# Patient Record
Sex: FEMALE | Race: WHITE | Employment: PART TIME | URBAN - NONMETROPOLITAN AREA
[De-identification: names, ages, dates, MRNs, and addresses within clinical notes are randomized per-mention and may not be internally consistent; named-entity substitution may affect disease eponyms.]

---

## 2018-09-26 ENCOUNTER — HOSPITAL ENCOUNTER (OUTPATIENT)
Age: 58
Discharge: HOME | End: 2018-09-26
Admitting: PSYCHIATRY & NEUROLOGY

## 2018-09-26 LAB
ANION GAP SERPL CALCULATED.3IONS-SCNC: 13 MEQ/L (ref 8–16)
BUN BLDV-MCNC: 20 MG/DL (ref 7–22)
CALCIUM SERPL-MCNC: 9.8 MG/DL (ref 8.5–10.5)
CHLORIDE BLD-SCNC: 100 MEQ/L (ref 98–111)
CO2: 27 MEQ/L (ref 23–33)
CREAT SERPL-MCNC: 0.8 MG/DL (ref 0.4–1.2)
ERYTHROCYTE [DISTWIDTH] IN BLOOD BY AUTOMATED COUNT: 13.3 % (ref 11.5–14.5)
ERYTHROCYTE [DISTWIDTH] IN BLOOD BY AUTOMATED COUNT: 43.8 FL (ref 35–45)
GFR SERPL CREATININE-BSD FRML MDRD: 74 ML/MIN/1.73M2
GLUCOSE BLD-MCNC: 134 MG/DL (ref 70–108)
HCT VFR BLD CALC: 47.7 % (ref 37–47)
HEMOGLOBIN: 16 GM/DL (ref 12–16)
INR BLD: 1.01 (ref 0.85–1.13)
MCH RBC QN AUTO: 30.4 PG (ref 26–33)
MCHC RBC AUTO-ENTMCNC: 33.5 GM/DL (ref 32.2–35.5)
MCV RBC AUTO: 90.5 FL (ref 81–99)
PLATELET # BLD: 183 THOU/MM3 (ref 130–400)
PMV BLD AUTO: 11 FL (ref 9.4–12.4)
POTASSIUM SERPL-SCNC: 4.4 MEQ/L (ref 3.5–5.2)
RBC # BLD: 5.27 MILL/MM3 (ref 4.2–5.4)
SODIUM BLD-SCNC: 140 MEQ/L (ref 135–145)
WBC # BLD: 7.5 THOU/MM3 (ref 4.8–10.8)

## 2018-09-26 PROCEDURE — 36415 COLL VENOUS BLD VENIPUNCTURE: CPT

## 2018-09-26 PROCEDURE — 85027 COMPLETE CBC AUTOMATED: CPT

## 2018-09-26 PROCEDURE — 85610 PROTHROMBIN TIME: CPT

## 2018-09-26 PROCEDURE — 80048 BASIC METABOLIC PNL TOTAL CA: CPT

## 2020-03-04 ENCOUNTER — HOSPITAL ENCOUNTER (OUTPATIENT)
Age: 60
Setting detail: SPECIMEN
Discharge: HOME | End: 2020-03-04
Payer: COMMERCIAL

## 2020-03-04 LAB
ABSOLUTE EOS #: 0.09 K/UL (ref 0–0.44)
ABSOLUTE IMMATURE GRANULOCYTE: <0.03 K/UL (ref 0–0.3)
ABSOLUTE LYMPH #: 1.49 K/UL (ref 1.1–3.7)
ABSOLUTE MONO #: 0.47 K/UL (ref 0.1–1.2)
ALBUMIN SERPL-MCNC: 3.9 G/DL (ref 3.5–5.2)
ALBUMIN/GLOBULIN RATIO: 1.4 (ref 1–2.5)
ALP BLD-CCNC: 84 U/L (ref 35–104)
ALT SERPL-CCNC: 144 U/L (ref 5–33)
ANION GAP SERPL CALCULATED.3IONS-SCNC: 14 MMOL/L (ref 9–17)
AST SERPL-CCNC: 73 U/L
BASOPHILS # BLD: 1 % (ref 0–2)
BASOPHILS ABSOLUTE: 0.03 K/UL (ref 0–0.2)
BILIRUB SERPL-MCNC: 1.63 MG/DL (ref 0.3–1.2)
BUN BLDV-MCNC: 15 MG/DL (ref 6–20)
BUN/CREAT BLD: ABNORMAL (ref 9–20)
CALCIUM SERPL-MCNC: 9.7 MG/DL (ref 8.6–10.4)
CHLORIDE BLD-SCNC: 100 MMOL/L (ref 98–107)
CHOLESTEROL/HDL RATIO: 3.8
CHOLESTEROL: 157 MG/DL
CO2: 24 MMOL/L (ref 20–31)
CREAT SERPL-MCNC: 0.56 MG/DL (ref 0.5–0.9)
DIFFERENTIAL TYPE: ABNORMAL
EOSINOPHILS RELATIVE PERCENT: 2 % (ref 1–4)
GFR AFRICAN AMERICAN: >60 ML/MIN
GFR NON-AFRICAN AMERICAN: >60 ML/MIN
GFR SERPL CREATININE-BSD FRML MDRD: ABNORMAL ML/MIN/{1.73_M2}
GFR SERPL CREATININE-BSD FRML MDRD: ABNORMAL ML/MIN/{1.73_M2}
GLUCOSE BLD-MCNC: 296 MG/DL (ref 70–99)
HCT VFR BLD CALC: 48.3 % (ref 36.3–47.1)
HDLC SERPL-MCNC: 41 MG/DL
HEMOGLOBIN: 15.8 G/DL (ref 11.9–15.1)
IMMATURE GRANULOCYTES: 0 %
LDL CHOLESTEROL: 84 MG/DL (ref 0–130)
LYMPHOCYTES # BLD: 29 % (ref 24–43)
MCH RBC QN AUTO: 29.6 PG (ref 25.2–33.5)
MCHC RBC AUTO-ENTMCNC: 32.7 G/DL (ref 28.4–34.8)
MCV RBC AUTO: 90.4 FL (ref 82.6–102.9)
MONOCYTES # BLD: 9 % (ref 3–12)
NRBC AUTOMATED: 0 PER 100 WBC
PDW BLD-RTO: 12.6 % (ref 11.8–14.4)
PLATELET # BLD: 125 K/UL (ref 138–453)
PLATELET ESTIMATE: ABNORMAL
PMV BLD AUTO: 12.1 FL (ref 8.1–13.5)
POTASSIUM SERPL-SCNC: 3.7 MMOL/L (ref 3.7–5.3)
RBC # BLD: 5.34 M/UL (ref 3.95–5.11)
RBC # BLD: ABNORMAL 10*6/UL
SEG NEUTROPHILS: 59 % (ref 36–65)
SEGMENTED NEUTROPHILS ABSOLUTE COUNT: 3.07 K/UL (ref 1.5–8.1)
SODIUM BLD-SCNC: 138 MMOL/L (ref 135–144)
TOTAL PROTEIN: 6.7 G/DL (ref 6.4–8.3)
TRIGL SERPL-MCNC: 159 MG/DL
TSH SERPL DL<=0.05 MIU/L-ACNC: 1.53 MIU/L (ref 0.3–5)
VLDLC SERPL CALC-MCNC: ABNORMAL MG/DL (ref 1–30)
WBC # BLD: 5.2 K/UL (ref 3.5–11.3)
WBC # BLD: ABNORMAL 10*3/UL

## 2020-09-02 ENCOUNTER — HOSPITAL ENCOUNTER (OUTPATIENT)
Age: 60
Setting detail: SPECIMEN
Discharge: HOME | End: 2020-09-02
Payer: COMMERCIAL

## 2020-09-03 LAB
ALBUMIN SERPL-MCNC: 4.5 G/DL (ref 3.5–5.2)
ALBUMIN/GLOBULIN RATIO: 1.6 (ref 1–2.5)
ALP BLD-CCNC: 72 U/L (ref 35–104)
ALT SERPL-CCNC: 33 U/L (ref 5–33)
AMYLASE: 34 U/L (ref 28–100)
AST SERPL-CCNC: 26 U/L
BILIRUB SERPL-MCNC: 1.54 MG/DL (ref 0.3–1.2)
BILIRUBIN DIRECT: 0.33 MG/DL
BILIRUBIN, INDIRECT: 1.21 MG/DL (ref 0–1)
GLOBULIN: ABNORMAL G/DL (ref 1.5–3.8)
LIPASE: 38 U/L (ref 13–60)
TOTAL PROTEIN: 7.3 G/DL (ref 6.4–8.3)

## 2021-04-21 ENCOUNTER — APPOINTMENT (OUTPATIENT)
Dept: GENERAL RADIOLOGY | Age: 61
DRG: 287 | End: 2021-04-21
Payer: COMMERCIAL

## 2021-04-21 ENCOUNTER — HOSPITAL ENCOUNTER (INPATIENT)
Age: 61
LOS: 2 days | Discharge: HOME | DRG: 287 | End: 2021-04-23
Attending: EMERGENCY MEDICINE | Admitting: INTERNAL MEDICINE
Payer: COMMERCIAL

## 2021-04-21 DIAGNOSIS — I50.23 ACUTE ON CHRONIC SYSTOLIC HEART FAILURE (HCC): ICD-10-CM

## 2021-04-21 DIAGNOSIS — I42.2 HYPERTROPHIC CARDIOMYOPATHY (HCC): Primary | ICD-10-CM

## 2021-04-21 DIAGNOSIS — R77.8 ELEVATED TROPONIN: ICD-10-CM

## 2021-04-21 PROBLEM — I20.0 UNSTABLE ANGINA (HCC): Status: ACTIVE | Noted: 2021-04-21

## 2021-04-21 PROBLEM — R07.9 CHEST PAIN: Status: ACTIVE | Noted: 2021-04-21

## 2021-04-21 LAB
ANION GAP SERPL CALCULATED.3IONS-SCNC: 12 MEQ/L (ref 8–16)
AVERAGE GLUCOSE: 108 MG/DL (ref 70–126)
BASOPHILS # BLD: 0.5 %
BASOPHILS ABSOLUTE: 0 THOU/MM3 (ref 0–0.1)
BUN BLDV-MCNC: 20 MG/DL (ref 7–22)
CALCIUM SERPL-MCNC: 9.9 MG/DL (ref 8.5–10.5)
CHLORIDE BLD-SCNC: 105 MEQ/L (ref 98–111)
CHOLESTEROL, TOTAL: 122 MG/DL (ref 100–199)
CO2: 25 MEQ/L (ref 23–33)
CREAT SERPL-MCNC: 0.7 MG/DL (ref 0.4–1.2)
EKG ATRIAL RATE: 77 BPM
EKG P AXIS: 94 DEGREES
EKG P-R INTERVAL: 154 MS
EKG Q-T INTERVAL: 466 MS
EKG QRS DURATION: 172 MS
EKG QTC CALCULATION (BAZETT): 527 MS
EKG R AXIS: -77 DEGREES
EKG T AXIS: 83 DEGREES
EKG VENTRICULAR RATE: 77 BPM
EOSINOPHIL # BLD: 0 %
EOSINOPHILS ABSOLUTE: 0 THOU/MM3 (ref 0–0.4)
ERYTHROCYTE [DISTWIDTH] IN BLOOD BY AUTOMATED COUNT: 14.1 % (ref 11.5–14.5)
ERYTHROCYTE [DISTWIDTH] IN BLOOD BY AUTOMATED COUNT: 47 FL (ref 35–45)
GFR SERPL CREATININE-BSD FRML MDRD: 86 ML/MIN/1.73M2
GLUCOSE BLD-MCNC: 119 MG/DL (ref 70–108)
GLUCOSE BLD-MCNC: 126 MG/DL (ref 70–108)
GLUCOSE BLD-MCNC: 87 MG/DL (ref 70–108)
GLUCOSE BLD-MCNC: 96 MG/DL (ref 70–108)
HBA1C MFR BLD: 5.6 % (ref 4.4–6.4)
HCT VFR BLD CALC: 39 % (ref 37–47)
HDLC SERPL-MCNC: 44 MG/DL
HEMOGLOBIN: 12.3 GM/DL (ref 12–16)
IMMATURE GRANS (ABS): 0.01 THOU/MM3 (ref 0–0.07)
IMMATURE GRANULOCYTES: 0.2 %
LDL CHOLESTEROL CALCULATED: 56 MG/DL
LV EF: 43 %
LVEF MODALITY: NORMAL
LYMPHOCYTES # BLD: 20.4 %
LYMPHOCYTES ABSOLUTE: 1.3 THOU/MM3 (ref 1–4.8)
MCH RBC QN AUTO: 29.3 PG (ref 26–33)
MCHC RBC AUTO-ENTMCNC: 31.5 GM/DL (ref 32.2–35.5)
MCV RBC AUTO: 92.9 FL (ref 81–99)
MONOCYTES # BLD: 7.1 %
MONOCYTES ABSOLUTE: 0.4 THOU/MM3 (ref 0.4–1.3)
NUCLEATED RED BLOOD CELLS: 0 /100 WBC
OSMOLALITY CALCULATION: 287.3 MOSMOL/KG (ref 275–300)
PLATELET # BLD: 175 THOU/MM3 (ref 130–400)
PMV BLD AUTO: 11.6 FL (ref 9.4–12.4)
POTASSIUM SERPL-SCNC: 3.4 MEQ/L (ref 3.5–5.2)
PRO-BNP: 4371 PG/ML (ref 0–900)
RBC # BLD: 4.2 MILL/MM3 (ref 4.2–5.4)
SEG NEUTROPHILS: 71.8 %
SEGMENTED NEUTROPHILS ABSOLUTE COUNT: 4.5 THOU/MM3 (ref 1.8–7.7)
SODIUM BLD-SCNC: 142 MEQ/L (ref 135–145)
TRIGL SERPL-MCNC: 112 MG/DL (ref 0–199)
TROPONIN T: 0.01 NG/ML
TROPONIN T: 0.01 NG/ML
TROPONIN T: < 0.01 NG/ML
WBC # BLD: 6.2 THOU/MM3 (ref 4.8–10.8)

## 2021-04-21 PROCEDURE — 85025 COMPLETE CBC W/AUTO DIFF WBC: CPT

## 2021-04-21 PROCEDURE — 82948 REAGENT STRIP/BLOOD GLUCOSE: CPT

## 2021-04-21 PROCEDURE — 93010 ELECTROCARDIOGRAM REPORT: CPT | Performed by: INTERNAL MEDICINE

## 2021-04-21 PROCEDURE — 93306 TTE W/DOPPLER COMPLETE: CPT

## 2021-04-21 PROCEDURE — 6360000002 HC RX W HCPCS: Performed by: INTERNAL MEDICINE

## 2021-04-21 PROCEDURE — 80048 BASIC METABOLIC PNL TOTAL CA: CPT

## 2021-04-21 PROCEDURE — 83880 ASSAY OF NATRIURETIC PEPTIDE: CPT

## 2021-04-21 PROCEDURE — 71045 X-RAY EXAM CHEST 1 VIEW: CPT

## 2021-04-21 PROCEDURE — 2060000000 HC ICU INTERMEDIATE R&B

## 2021-04-21 PROCEDURE — 99284 EMERGENCY DEPT VISIT MOD MDM: CPT

## 2021-04-21 PROCEDURE — 2580000003 HC RX 258: Performed by: INTERNAL MEDICINE

## 2021-04-21 PROCEDURE — 80061 LIPID PANEL: CPT

## 2021-04-21 PROCEDURE — 6370000000 HC RX 637 (ALT 250 FOR IP): Performed by: STUDENT IN AN ORGANIZED HEALTH CARE EDUCATION/TRAINING PROGRAM

## 2021-04-21 PROCEDURE — 6370000000 HC RX 637 (ALT 250 FOR IP): Performed by: INTERNAL MEDICINE

## 2021-04-21 PROCEDURE — 99223 1ST HOSP IP/OBS HIGH 75: CPT | Performed by: INTERNAL MEDICINE

## 2021-04-21 PROCEDURE — 93005 ELECTROCARDIOGRAM TRACING: CPT | Performed by: EMERGENCY MEDICINE

## 2021-04-21 PROCEDURE — 84484 ASSAY OF TROPONIN QUANT: CPT

## 2021-04-21 PROCEDURE — 83036 HEMOGLOBIN GLYCOSYLATED A1C: CPT

## 2021-04-21 PROCEDURE — 36415 COLL VENOUS BLD VENIPUNCTURE: CPT

## 2021-04-21 RX ORDER — NICOTINE POLACRILEX 4 MG
15 LOZENGE BUCCAL PRN
Status: DISCONTINUED | OUTPATIENT
Start: 2021-04-21 | End: 2021-04-23 | Stop reason: HOSPADM

## 2021-04-21 RX ORDER — ONDANSETRON 2 MG/ML
4 INJECTION INTRAMUSCULAR; INTRAVENOUS EVERY 6 HOURS PRN
Status: DISCONTINUED | OUTPATIENT
Start: 2021-04-21 | End: 2021-04-23 | Stop reason: HOSPADM

## 2021-04-21 RX ORDER — DEXTROSE MONOHYDRATE 25 G/50ML
12.5 INJECTION, SOLUTION INTRAVENOUS PRN
Status: DISCONTINUED | OUTPATIENT
Start: 2021-04-21 | End: 2021-04-23 | Stop reason: HOSPADM

## 2021-04-21 RX ORDER — BUPROPION HYDROCHLORIDE 300 MG/1
300 TABLET ORAL EVERY MORNING
Status: CANCELLED | OUTPATIENT
Start: 2021-04-21

## 2021-04-21 RX ORDER — SACUBITRIL AND VALSARTAN 24; 26 MG/1; MG/1
1 TABLET, FILM COATED ORAL 2 TIMES DAILY
COMMUNITY

## 2021-04-21 RX ORDER — ACETAMINOPHEN 650 MG/1
650 SUPPOSITORY RECTAL EVERY 6 HOURS PRN
Status: DISCONTINUED | OUTPATIENT
Start: 2021-04-21 | End: 2021-04-23 | Stop reason: HOSPADM

## 2021-04-21 RX ORDER — DEXTROSE MONOHYDRATE 50 MG/ML
100 INJECTION, SOLUTION INTRAVENOUS PRN
Status: DISCONTINUED | OUTPATIENT
Start: 2021-04-21 | End: 2021-04-23 | Stop reason: HOSPADM

## 2021-04-21 RX ORDER — ACETAMINOPHEN 325 MG/1
650 TABLET ORAL EVERY 6 HOURS PRN
Status: DISCONTINUED | OUTPATIENT
Start: 2021-04-21 | End: 2021-04-23 | Stop reason: SDUPTHER

## 2021-04-21 RX ORDER — PROMETHAZINE HYDROCHLORIDE 25 MG/1
12.5 TABLET ORAL EVERY 6 HOURS PRN
Status: DISCONTINUED | OUTPATIENT
Start: 2021-04-21 | End: 2021-04-23 | Stop reason: HOSPADM

## 2021-04-21 RX ORDER — FUROSEMIDE 20 MG/1
20 TABLET ORAL PRN
COMMUNITY

## 2021-04-21 RX ORDER — ATORVASTATIN CALCIUM 20 MG/1
20 TABLET, FILM COATED ORAL DAILY
Status: DISCONTINUED | OUTPATIENT
Start: 2021-04-21 | End: 2021-04-23 | Stop reason: HOSPADM

## 2021-04-21 RX ORDER — FUROSEMIDE 10 MG/ML
20 INJECTION INTRAMUSCULAR; INTRAVENOUS DAILY
Status: DISCONTINUED | OUTPATIENT
Start: 2021-04-21 | End: 2021-04-21

## 2021-04-21 RX ORDER — SODIUM CHLORIDE 0.9 % (FLUSH) 0.9 %
5-40 SYRINGE (ML) INJECTION PRN
Status: DISCONTINUED | OUTPATIENT
Start: 2021-04-21 | End: 2021-04-23 | Stop reason: HOSPADM

## 2021-04-21 RX ORDER — ASPIRIN 81 MG/1
324 TABLET, CHEWABLE ORAL ONCE
Status: COMPLETED | OUTPATIENT
Start: 2021-04-21 | End: 2021-04-21

## 2021-04-21 RX ORDER — BUSPIRONE HYDROCHLORIDE 5 MG/1
5 TABLET ORAL PRN
COMMUNITY

## 2021-04-21 RX ORDER — POLYETHYLENE GLYCOL 3350 17 G/17G
17 POWDER, FOR SOLUTION ORAL DAILY PRN
Status: DISCONTINUED | OUTPATIENT
Start: 2021-04-21 | End: 2021-04-23 | Stop reason: HOSPADM

## 2021-04-21 RX ORDER — SODIUM CHLORIDE 9 MG/ML
25 INJECTION, SOLUTION INTRAVENOUS PRN
Status: DISCONTINUED | OUTPATIENT
Start: 2021-04-21 | End: 2021-04-23 | Stop reason: HOSPADM

## 2021-04-21 RX ORDER — SODIUM CHLORIDE 0.9 % (FLUSH) 0.9 %
5-40 SYRINGE (ML) INJECTION EVERY 12 HOURS SCHEDULED
Status: DISCONTINUED | OUTPATIENT
Start: 2021-04-21 | End: 2021-04-23 | Stop reason: HOSPADM

## 2021-04-21 RX ORDER — RANOLAZINE 500 MG/1
500 TABLET, EXTENDED RELEASE ORAL 2 TIMES DAILY
Status: CANCELLED | OUTPATIENT
Start: 2021-04-21

## 2021-04-21 RX ORDER — CITALOPRAM 40 MG/1
40 TABLET ORAL DAILY
Status: DISCONTINUED | OUTPATIENT
Start: 2021-04-21 | End: 2021-04-23 | Stop reason: HOSPADM

## 2021-04-21 RX ORDER — BUSPIRONE HYDROCHLORIDE 5 MG/1
5 TABLET ORAL DAILY PRN
Status: DISCONTINUED | OUTPATIENT
Start: 2021-04-21 | End: 2021-04-23 | Stop reason: HOSPADM

## 2021-04-21 RX ORDER — CARVEDILOL 6.25 MG/1
12.5 TABLET ORAL 2 TIMES DAILY WITH MEALS
Status: DISCONTINUED | OUTPATIENT
Start: 2021-04-21 | End: 2021-04-23 | Stop reason: HOSPADM

## 2021-04-21 RX ORDER — CITALOPRAM 40 MG/1
40 TABLET ORAL DAILY
COMMUNITY

## 2021-04-21 RX ORDER — FUROSEMIDE 10 MG/ML
60 INJECTION INTRAMUSCULAR; INTRAVENOUS 2 TIMES DAILY
Status: DISCONTINUED | OUTPATIENT
Start: 2021-04-21 | End: 2021-04-22

## 2021-04-21 RX ADMIN — ATORVASTATIN CALCIUM 20 MG: 20 TABLET, FILM COATED ORAL at 21:59

## 2021-04-21 RX ADMIN — SODIUM CHLORIDE, PRESERVATIVE FREE 10 ML: 5 INJECTION INTRAVENOUS at 21:59

## 2021-04-21 RX ADMIN — ENOXAPARIN SODIUM 40 MG: 40 INJECTION SUBCUTANEOUS at 16:33

## 2021-04-21 RX ADMIN — SODIUM CHLORIDE, PRESERVATIVE FREE 10 ML: 5 INJECTION INTRAVENOUS at 16:33

## 2021-04-21 RX ADMIN — ASPIRIN 324 MG: 81 TABLET, CHEWABLE ORAL at 12:26

## 2021-04-21 RX ADMIN — CITALOPRAM 40 MG: 40 TABLET, FILM COATED ORAL at 18:38

## 2021-04-21 RX ADMIN — FUROSEMIDE 60 MG: 10 INJECTION, SOLUTION INTRAMUSCULAR; INTRAVENOUS at 16:33

## 2021-04-21 ASSESSMENT — PAIN SCALES - GENERAL
PAINLEVEL_OUTOF10: 0
PAINLEVEL_OUTOF10: 3

## 2021-04-21 ASSESSMENT — PAIN DESCRIPTION - PAIN TYPE
TYPE: ACUTE PAIN

## 2021-04-21 ASSESSMENT — ENCOUNTER SYMPTOMS
COUGH: 0
CHEST TIGHTNESS: 0
STRIDOR: 0
ABDOMINAL DISTENTION: 1
CHOKING: 0
COLOR CHANGE: 0
SHORTNESS OF BREATH: 1
ABDOMINAL PAIN: 0
VOMITING: 0
NAUSEA: 0
DIARRHEA: 0
CONSTIPATION: 0

## 2021-04-21 ASSESSMENT — PAIN DESCRIPTION - LOCATION
LOCATION: CHEST

## 2021-04-21 ASSESSMENT — PAIN DESCRIPTION - FREQUENCY
FREQUENCY: CONTINUOUS
FREQUENCY: CONTINUOUS

## 2021-04-21 ASSESSMENT — PAIN DESCRIPTION - DESCRIPTORS: DESCRIPTORS: PRESSURE

## 2021-04-21 ASSESSMENT — PAIN DESCRIPTION - ORIENTATION
ORIENTATION: MID
ORIENTATION: MID

## 2021-04-21 NOTE — ED NOTES
In for hourly rounding. Pt resting on chair at bedside. Pt remains A&Ox4, resps easy and unlabored. IV shows no s/s of infection or infiltration. Pt pain remains unchanged at this time. Monitor remains in place. Updated pt on POC. Will monitor.      Titus Pan RN  04/21/21 9772

## 2021-04-21 NOTE — PROGRESS NOTES
Patient admitted to 4A Room 15 in a wheelchair and from ED  Complaint upon arrival to the room chest pain 1/10  IV none infusing into the forearm left, condition patent and no redness. IV site free of s/s of infection or infiltration. Vital signs obtained. Assessment and data collection initiated. ith patient. 2 person skin check complet Oriented to room. Policies and procedures for  explained All questions answered with no further questions at this time. Fall prevention and safety brochure discussed wed.

## 2021-04-21 NOTE — Clinical Note
Patient Class: Observation [104]   REQUIRED: Diagnosis: Chest pain [756488]   Estimated Length of Stay: Estimated stay of less than 2 midnights   Admitting Provider: Dario Heard [[id number]]   Preferred Department: 8   Telemetry Bed Required?: Yes

## 2021-04-21 NOTE — ED NOTES
Upon first contact with patient this RN receives bedside shift report from [facility]. Pt is A&Ox4, resps easy and unlabored. IV shows no s/s of infection or infiltration. Pt reports chest pressure, midsternal 3/10. Pt requesting BGL taken at this time, 119. Pt denies and wants or concerns at this time. Monitor applied and VS as noted. Will monitor.      Titus Pan RN  04/21/21 9416

## 2021-04-21 NOTE — ED PROVIDER NOTES
MEDICAL AND SURGICAL HISTORY     Past Medical History:   Diagnosis Date    Hyperlipidemia     Hypertension      Past Surgical History:   Procedure Laterality Date    OTHER SURGICAL HISTORY  09/28/12    icd relpaced    OTHER SURGICAL HISTORY  2004    original icd          MEDICATIONS     Current Facility-Administered Medications:     aspirin chewable tablet 324 mg, 324 mg, Oral, Once, Michelle Matt DO    Current Outpatient Medications:     ranolazine (RANEXA) 500 MG SR tablet, Take 500 mg by mouth 2 times daily, Disp: , Rfl:     HYDROcodone-acetaminophen (NORCO) 5-325 MG per tablet, Take 1 tablet by mouth every 6 hours as needed for Pain, Disp: 10 tablet, Rfl: 0    cyclobenzaprine (FLEXERIL) 10 MG tablet, Take 1 tablet by mouth 3 times daily as needed for Muscle spasms, Disp: 10 tablet, Rfl: 0    disopyramide (NORPACE) 150 MG capsule, Take 150 mg by mouth. Take 2 capsules twice a day , Disp: , Rfl:     carvedilol (COREG) 12.5 MG tablet, Take 12.5 mg by mouth 2 times daily (with meals). , Disp: , Rfl:     UNABLE TO FIND, Maxide 37.5-25 mg once a day  , Disp: , Rfl:     atorvastatin (LIPITOR) 10 MG tablet, Take 10 mg by mouth daily. , Disp: , Rfl:     buPROPion (WELLBUTRIN XL) 300 MG XL tablet, Take 300 mg by mouth every morning.  , Disp: , Rfl:     vitamin D (CHOLECALCIFEROL) 1000 UNIT TABS tablet, Take 1,000 Units by mouth daily. , Disp: , Rfl:     lamoTRIgine (LAMICTAL) 25 MG tablet, Start Lamictal 25 mg daily for 2 weeks then 50 mg daily for 2 weeks then 100 mg daily thereafter. Report any rash. Call if any questions. , Disp: 120 tablet, Rfl: 3      SOCIAL HISTORY     Social History     Social History Narrative    Not on file     Social History     Tobacco Use    Smoking status: Never Smoker   Substance Use Topics    Alcohol use: Yes     Comment: Socially     Drug use: No         ALLERGIES   No Known Allergies      FAMILY HISTORY     Family History   Problem Relation Age of Onset    Heart Disease Mother     Other Mother         Parkinsons disease    Heart Disease Father     Cancer Sister     Heart Disease Brother          PREVIOUS RECORDS   Previous records reviewed: Seen 5 years ago in ER from a fall from ladder      [address]     ED Triage Vitals [04/21/21 1030]   BP Temp Temp Source Pulse Resp SpO2 Height Weight   (!) 132/54 98 °F (36.7 °C) Oral 70 18 98 % 4' 9\" (1.448 m) 144 lb (65.3 kg)     Initial vital signs and nursing assessment reviewed and normal. Body mass index is 31.16 kg/m². Pulsoximetry is normal per my interpretation. Additional Vital Signs:  Vitals:    04/21/21 1148   BP: 129/65   Pulse: 70   Resp: 18   Temp:    SpO2: 96%       Physical Exam  Constitutional:       General: She is not in acute distress. Appearance: She is well-developed. She is obese. She is not ill-appearing. HENT:      Head: Normocephalic and atraumatic. Pulmonary:      Effort: Pulmonary effort is normal. No tachypnea or accessory muscle usage. Breath sounds: Examination of the right-lower field reveals decreased breath sounds. Examination of the left-lower field reveals decreased breath sounds. Decreased breath sounds present. No rales. Chest:      Chest wall: No mass, tenderness or edema. Abdominal:      Palpations: Abdomen is soft. There is no hepatomegaly or mass. Tenderness: There is no guarding. Musculoskeletal:      Right lower leg: She exhibits no tenderness. No edema. Left lower leg: She exhibits no tenderness. No edema. Skin:     Capillary Refill: Capillary refill takes less than 2 seconds. Coloration: Skin is not cyanotic. Findings: No ecchymosis or rash. Neurological:      Mental Status: She is alert and oriented to person, place, and time. Psychiatric:         Mood and Affect: Mood normal.         Behavior: Behavior normal.             MEDICAL DECISION MAKING   Initial Assessment:   1. MI  2. Hypertrophic cardiomyopathy  3.  Systolic heart failure  Plan:    Get cardiac work-up. And chest x-ray. Troponin is bumped at 0.014. proBNP is also elevated at 4371. BMP is pertinent for mild hypokalemia of 3.4. CBC is unremarkable. No prior troponin or BNP to compare to. Patient has been taking her Lasix every day which is little concerning since hypertrophic cardiomyopathy is preload dependent. Vital signs currently stable. Spoke with Dr. Louisa Blanc from cardiology. Not think she needs a full ACS work-up. Advised to give her a full dose aspirin. He will evaluate her further later on. Consulted cardiology on behalf. Spoke with patient about treatment plan and admission. Contacted hospitalist for admission. ED RESULTS   Laboratory results:  Labs Reviewed   CBC WITH AUTO DIFFERENTIAL - Abnormal; Notable for the following components:       Result Value    MCHC 31.5 (*)     RDW-SD 47.0 (*)     All other components within normal limits   BASIC METABOLIC PANEL - Abnormal; Notable for the following components:    Potassium 3.4 (*)     Glucose 126 (*)     All other components within normal limits   TROPONIN - Abnormal; Notable for the following components:    Troponin T 0.014 (*)     All other components within normal limits   BRAIN NATRIURETIC PEPTIDE - Abnormal; Notable for the following components:    Pro-BNP 4371.0 (*)     All other components within normal limits   GLOMERULAR FILTRATION RATE, ESTIMATED - Abnormal; Notable for the following components:    Est, Glom Filt Rate 86 (*)     All other components within normal limits   POCT GLUCOSE - Abnormal; Notable for the following components:    POC Glucose 119 (*)     All other components within normal limits   ANION GAP   OSMOLALITY       Radiologic studies results:  XR CHEST PORTABLE   Final Result   1. Mild cardiomegaly and prominence of the left heart border, in this patient status post pacemaker placement and previous sternotomy. 2. Otherwise negative chest x-ray.                **This report has been created using voice recognition software. It may contain minor errors which are inherent in voice recognition technology. **      Final report electronically signed by DR Shola Aguero on 4/21/2021 11:17 AM          ED Medications administered this visit:   Medications   aspirin chewable tablet 324 mg (has no administration in time range)         ED COURSE     ED Course as of Apr 21 1224   Wed Apr 21, 2021   1132 Potassium(!): 3.4 [CLAU]      ED Course User Index  [CLAU] Michelle Matt DO         MEDICATION CHANGES     New Prescriptions    No medications on file         FINAL DISPOSITION     Final diagnoses:   Hypertrophic cardiomyopathy ([address]   Elevated troponin   Acute on chronic systolic heart failure (HCC)     Condition: condition: stable  Dispo: Admit to med/surg floor      This transcription was electronically signed. Parts of this transcriptions may have been dictated by use of voice recognition software and electronically transcribed, and parts may have been transcribed with the assistance of an ED scribe. The transcription may contain errors not detected in proofreading. Please refer to my supervising physician's documentation if my documentation differs.     Electronically Signed: Moe Goldstein, 04/21/21, 12:24 PM        Moe Goldstein DO  Resident  04/21/21 1229

## 2021-04-21 NOTE — ED NOTES
Patient resting in bed. Respirations easy and unlabored. No distress noted. Call light within reach. Updated on POC. Will monitor.       Chalino Xavier RN  04/21/21 1617

## 2021-04-21 NOTE — CONSULTS
[facility] [address] [place] [address]  Cardiology Consult        Patient:  Jess Hassan  YOB: 1960  MRN: [id number]     Acct: [de-identified]    PCP: Costa Story MD    Date of Admission: 4/21/2021      Reason for Consultation:  Chest pain      History Of Present Illness:    63 y.o. pleasant female c hx of HOCM s/p surgical myoectomy in 1990 ([address] [place] [address]), LV dysfunction EF 40%, hx of Cardiac arrest in 2004 s/p ICD, DM, HLD  who presented to the hospital with complaints of chest pressure and shortness of breath. As per patient the symptoms started about 1 month ago and progressive gotten worse. She has been taking more lasix lately for leg swelling and shortness of breath. She has more been having more productive cough while lying down. She had similar symptoms in the past for which lasix helped. She had cardiac cath 5 years ago which apparently just showed microvascular disease. She started about 6 weeks ago, she started entresto. Ekg shows A-S, V-P. ProBNP is 4371. She was taking Lasix 20mg daily . Past Medical History:          Diagnosis Date    Hyperlipidemia     Hypertension        Past Surgical History:          Procedure Laterality Date    OTHER SURGICAL HISTORY  09/28/12    icd relpaced   [address] HISTORY  2004    original icd        Medications Prior to Admission:      Prior to Admission medications    Medication Sig Start Date End Date Taking? Authorizing Provider   citalopram (CELEXA) 40 MG tablet Take 40 mg by mouth daily   Yes Historical Provider, MD   metFORMIN (GLUCOPHAGE) 500 MG tablet Take 500 mg by mouth 2 times daily (with meals)   Yes Historical Provider, MD   sacubitril-valsartan (ENTRESTO) 24-26 MG per tablet Take 1 tablet by mouth 2 times daily   Yes Historical Provider, MD   carvedilol (COREG) 12.5 MG tablet Take 12.5 mg by mouth 2 times daily (with meals).      Yes Historical Provider, MD   atorvastatin (LIPITOR) 10 MG tablet Take 20 mg by mouth daily    Yes Historical Provider, MD   furosemide (LASIX) 20 MG tablet Take 20 mg by mouth as needed    Historical Provider, MD   busPIRone (BUSPAR) 5 MG tablet Take 5 mg by mouth as needed    Historical Provider, MD       Current Facility-Administered Medications   Medication Dose Route Frequency Provider Last Rate Last Admin    atorvastatin (LIPITOR) tablet 20 mg  20 mg Oral Daily Isra Cash MD        carvedilol (COREG) tablet 12.5 mg  12.5 mg Oral BID WC Isra Cash MD        sodium chloride flush 0.9 % injection 5-40 mL  5-40 mL Intravenous 2 times per day Isra Cash MD        sodium chloride flush 0.9 % injection 5-40 mL  5-40 mL Intravenous PRN Isra Cash MD        0.9 % sodium chloride infusion  25 mL Intravenous PRN Isra Cash MD        enoxaparin (LOVENOX) injection 40 mg  40 mg Subcutaneous Q24H Isra Cash MD        promethazine (PHENERGAN) tablet 12.5 mg  12.5 mg Oral Q6H PRN Isra Cash MD        Or    ondansetron (ZOFRAN) injection 4 mg  4 mg Intravenous Q6H PRN Isra Cash MD        polyethylene glycol (GLYCOLAX) packet 17 g  17 g Oral Daily PRN Isra Cash MD        acetaminophen (TYLENOL) tablet 650 mg  650 mg Oral Q6H PRN Isra Cash MD        Or   Carrie Andi acetaminophen (TYLENOL) suppository 650 mg  650 mg Rectal Q6H PRN Isra Cash MD        furosemide (LASIX) injection 20 mg  20 mg Intravenous Daily Isra Cash MD        busPIRone (BUSPAR) tablet 5 mg  5 mg Oral Daily PRN Isra Cash MD        citalopram (CELEXA) tablet 40 mg  40 mg Oral Daily Isra Cash MD        insulin lispro (HUMALOG) injection vial 0-6 Units  0-6 Units Subcutaneous TID WC Isra Cash MD        insulin lispro (HUMALOG) injection vial 0-3 Units  0-3 Units Subcutaneous Nightly Isra Cash MD        glucose (GLUTOSE) 40 % oral gel 15 g  15 g Oral PRN Isra Cash MD        dextrose 50 % IV solution  12.5 g care.    Code Status: Full Code    Electronically signed by Alfredito Stone MD on 4/21/2021 at 2:42 PM

## 2021-04-21 NOTE — ED TRIAGE NOTES
Presents to ED with c/o sob and chest tightness that started about 2 weeks and getting worse. States she called the cardiologist yesterday and was advised to come here. Alert and oriented. Respirations easy and unlabored.

## 2021-04-21 NOTE — PROGRESS NOTES
Pt requests cardiologist Dr Martinez in [place] ([phone])  & Dr Alvino Reza in [facility], [address] [place] [address] be updated on admission.

## 2021-04-21 NOTE — H&P
History & Physical        Patient:  Kimberly Kowalski  YOB: 1960    MRN: [id number]     Acct: [de-identified]    PCP: Henrietta Salguero MD    Date of Admission: 4/21/2021    Date of Service: Pt seen/examined on 4/21/2021   and Admitted to Inpatient with expected LOS greater than two midnights due to medical therapy. Chief Complaint:  Chest pain, sob       History Of Present Illness:      63 y.o. female who presented to [address] [place] [address] with one week history of worsening chest pain and sob. Pt reports to have known hypertrophic cardiomyopathy, follows up with Cardiologist in [place]. Pt has been having increasing episodes of pressure/heaviness in her chest, substernal with no radiation. Associated with sob, dizziness and lightheadedness. Chest pain comes and goes, worse with activity and improves with rest. Pt has also been complaining of increasing sob as well, has been needing to sleep on multiple pillows each day. Pt has been taking her Lasix pills at home each day as opposed to prn. Denies any recent fevers, chills, cough, congestion, abdominal pain, diarrhea, nausea, vomiting or focal neurological deficits. In the ED, HD stable on RA with Pulse Ox 96%. Labs remarkable for elevated BNP and Troponin. CXR showing cardiomegaly. Past Medical History:          Diagnosis Date    Hyperlipidemia     Hypertension        Past Surgical History:          Procedure Laterality Date    OTHER SURGICAL HISTORY  09/28/12    icd relpaced   [address] HISTORY  2004    original icd        Medications Prior to Admission:      Prior to Admission medications    Medication Sig Start Date End Date Taking?  Authorizing Provider   citalopram (CELEXA) 40 MG tablet Take 40 mg by mouth daily   Yes Historical Provider, MD   metFORMIN (GLUCOPHAGE) 500 MG tablet Take 500 mg by mouth 2 times daily (with meals)   Yes Historical Provider, MD   sacubitril-valsartan (ENTRESTO) 24-26 MG per tablet Take 1 tablet by mouth 2 times daily   Yes Historical Provider, MD   carvedilol (COREG) 12.5 MG tablet Take 12.5 mg by mouth 2 times daily (with meals). Yes Historical Provider, MD   atorvastatin (LIPITOR) 10 MG tablet Take 20 mg by mouth daily    Yes Historical Provider, MD   furosemide (LASIX) 20 MG tablet Take 20 mg by mouth as needed    Historical Provider, MD   busPIRone (BUSPAR) 5 MG tablet Take 5 mg by mouth as needed    Historical Provider, MD       Allergies:  Patient has no known allergies. Social History:     Social History     Socioeconomic History    Marital status: Legally      Spouse name: None    Number of children: None    Years of education: None    Highest education level: None   Occupational History    None   Social Needs    Financial resource strain: None    Food insecurity     Worry: None     Inability: None    Transportation needs     Medical: None     Non-medical: None   Tobacco Use    Smoking status: Never Smoker   Substance and Sexual Activity    Alcohol use: Yes     Comment: Socially     Drug use: No    Sexual activity: None   Lifestyle    Physical activity     Days per week: None     Minutes per session: None    Stress: None   Relationships    Social connections     Talks on phone: None     Gets together: None     Attends [employer] service: None     Active member of club or organization: None     Attends meetings of clubs or organizations: None     Relationship status: None    Intimate partner violence     Fear of current or ex partner: None     Emotionally abused: None     Physically abused: None     Forced sexual activity: None   Other Topics Concern    None   Social History Narrative    None       Family History:       Reviewed in detail and negative for DM, CAD, Cancer, CVA.  Positive as follows:        Problem Relation Age of Onset    Heart Disease Mother     Other Mother         Parkinsons disease    Heart Disease Father     Cancer Sister     Heart Disease Brother        Diet:  No diet orders on file    REVIEW OF SYSTEMS:   Pertinent positives as noted in the HPI. All other systems reviewed and negative. PHYSICAL EXAM:    /65   Pulse 70   Temp 98 °F (36.7 °C) (Oral)   Resp 18   Ht 4' 9\" (1.448 m)   Wt 144 lb (65.3 kg)   SpO2 96%   BMI 31.16 kg/m²     General appearance:  No apparent distress, appears stated age and cooperative. HEENT:  Normal cephalic, atraumatic without obvious deformity. Pupils equal, round, and reactive to light. Extra ocular muscles intact. Conjunctivae/corneas clear. Neck: Supple, with full range of motion. No jugular venous distention. Trachea midline. Respiratory:  Normal respiratory effort. Clear to auscultation, bilaterally without Rales/Wheezes/Rhonchi. Cardiovascular:  Regular rate and rhythm with normal S1/S2 without murmurs, rubs or gallops. Abdomen: Soft, non-tender, non-distended with normal bowel sounds. Musculoskeletal:  No clubbing, cyanosis or edema bilaterally. Full range of motion without deformity. Skin: Skin color, texture, turgor normal.  No rashes or lesions. Neurologic:  Neurovascularly intact without any focal sensory/motor deficits. Cranial nerves: II-XII intact, grossly non-focal.  Psychiatric:  Alert and oriented, thought content appropriate, normal insight  Capillary Refill: Brisk,< 3 seconds   Peripheral Pulses: +2 palpable, equal bilaterally       Labs:     Recent Labs     04/21/21  1020   WBC 6.2   HGB 12.3   HCT 39.0        Recent Labs     04/21/21  1020      K 3.4*      CO2 25   BUN 20   CREATININE 0.7   CALCIUM 9.9     No results for input(s): AST, ALT, BILIDIR, BILITOT, ALKPHOS in the last 72 hours. No results for input(s): INR in the last 72 hours. No results for input(s): Ariana Burdock in the last 72 hours. Urinalysis:    No results found for: NITRU, WBCUA, BACTERIA, RBCUA, BLOODU, SPECGRAV, GLUCOSEU    Intake & Output:  No intake/output data recorded.   No intake/output data recorded. Radiology:     CXR: I have reviewed the CXR with the following interpretation: see below     XR CHEST PORTABLE   Final Result   1. Mild cardiomegaly and prominence of the left heart border, in this patient status post pacemaker placement and previous sternotomy. 2. Otherwise negative chest x-ray. **This report has been created using voice recognition software. It may contain minor errors which are inherent in voice recognition technology. **      Final report electronically signed by DR Xin Goodrich on 4/21/2021 11:17 AM          ASSESSMENT:    Active Hospital Problems    Diagnosis Date Noted    Chest pain [R07.9] 04/21/2021       Assessment/Plan:    1. Chest Pain with Elevated Troponin concerning for NSTEMI: will start on Heparin drip, ASA, BB, and Statin. Cont to trend troponin. Consult Cardiology. NPO after midnight. Pending 2D Echo.     2. Acute on Chronic HFrEF with known Hx of Hypertrophic Cardiomyopathy : elevated BNP with increased dyspnea. CXR showing cardiomegaly. Pending 2D Echo. Gentl IV lasix 20 qd. Strict I/Os. Daily weights    3. Anxiety: resume home medications     4. Elevated Troponin: concerning for NSTEMI, cont to trend. Consult Cardiology. 5. Type 2 DM: hold Metformin, cont Humalog Sliding Scale. Diabetic Diet. Accu-checks         Thank you Eusebio Alexander MD for the opportunity to be involved in this patient's care.     Electronically signed by Nilsa Mendez MD on 4/21/2021 at 12:52 PM

## 2021-04-21 NOTE — ED NOTES
Bed: 040A  Expected date:   Expected time:   Means of arrival:   Comments:  IP HOLDS     Almas Marshall RN  04/21/21 2474

## 2021-04-22 LAB
ANION GAP SERPL CALCULATED.3IONS-SCNC: 12 MEQ/L (ref 8–16)
BASOPHILS # BLD: 0.5 %
BASOPHILS ABSOLUTE: 0 THOU/MM3 (ref 0–0.1)
BUN BLDV-MCNC: 22 MG/DL (ref 7–22)
CALCIUM SERPL-MCNC: 9.3 MG/DL (ref 8.5–10.5)
CHLORIDE BLD-SCNC: 103 MEQ/L (ref 98–111)
CO2: 27 MEQ/L (ref 23–33)
CREAT SERPL-MCNC: 0.7 MG/DL (ref 0.4–1.2)
EOSINOPHIL # BLD: 3.2 %
EOSINOPHILS ABSOLUTE: 0.2 THOU/MM3 (ref 0–0.4)
ERYTHROCYTE [DISTWIDTH] IN BLOOD BY AUTOMATED COUNT: 14.2 % (ref 11.5–14.5)
ERYTHROCYTE [DISTWIDTH] IN BLOOD BY AUTOMATED COUNT: 46.7 FL (ref 35–45)
GFR SERPL CREATININE-BSD FRML MDRD: 86 ML/MIN/1.73M2
GLUCOSE BLD-MCNC: 104 MG/DL (ref 70–108)
GLUCOSE BLD-MCNC: 116 MG/DL (ref 70–108)
GLUCOSE BLD-MCNC: 161 MG/DL (ref 70–108)
GLUCOSE BLD-MCNC: 85 MG/DL (ref 70–108)
HCT VFR BLD CALC: 35.6 % (ref 37–47)
HEMOGLOBIN: 11.2 GM/DL (ref 12–16)
IMMATURE GRANS (ABS): 0.02 THOU/MM3 (ref 0–0.07)
IMMATURE GRANULOCYTES: 0.3 %
LYMPHOCYTES # BLD: 31.5 %
LYMPHOCYTES ABSOLUTE: 1.9 THOU/MM3 (ref 1–4.8)
MAGNESIUM: 2 MG/DL (ref 1.6–2.4)
MCH RBC QN AUTO: 28.9 PG (ref 26–33)
MCHC RBC AUTO-ENTMCNC: 31.5 GM/DL (ref 32.2–35.5)
MCV RBC AUTO: 92 FL (ref 81–99)
MONOCYTES # BLD: 6.9 %
MONOCYTES ABSOLUTE: 0.4 THOU/MM3 (ref 0.4–1.3)
NUCLEATED RED BLOOD CELLS: 0 /100 WBC
PLATELET # BLD: 160 THOU/MM3 (ref 130–400)
PMV BLD AUTO: 11.3 FL (ref 9.4–12.4)
POTASSIUM SERPL-SCNC: 3.1 MEQ/L (ref 3.5–5.2)
RBC # BLD: 3.87 MILL/MM3 (ref 4.2–5.4)
SEG NEUTROPHILS: 57.6 %
SEGMENTED NEUTROPHILS ABSOLUTE COUNT: 3.4 THOU/MM3 (ref 1.8–7.7)
SODIUM BLD-SCNC: 142 MEQ/L (ref 135–145)
WBC # BLD: 5.9 THOU/MM3 (ref 4.8–10.8)

## 2021-04-22 PROCEDURE — 36415 COLL VENOUS BLD VENIPUNCTURE: CPT

## 2021-04-22 PROCEDURE — 6360000002 HC RX W HCPCS: Performed by: INTERNAL MEDICINE

## 2021-04-22 PROCEDURE — 83735 ASSAY OF MAGNESIUM: CPT

## 2021-04-22 PROCEDURE — 6360000002 HC RX W HCPCS: Performed by: NURSE PRACTITIONER

## 2021-04-22 PROCEDURE — 99232 SBSQ HOSP IP/OBS MODERATE 35: CPT | Performed by: NURSE PRACTITIONER

## 2021-04-22 PROCEDURE — 2580000003 HC RX 258: Performed by: INTERNAL MEDICINE

## 2021-04-22 PROCEDURE — 99233 SBSQ HOSP IP/OBS HIGH 50: CPT | Performed by: INTERNAL MEDICINE

## 2021-04-22 PROCEDURE — 80048 BASIC METABOLIC PNL TOTAL CA: CPT

## 2021-04-22 PROCEDURE — 6370000000 HC RX 637 (ALT 250 FOR IP): Performed by: INTERNAL MEDICINE

## 2021-04-22 PROCEDURE — 85025 COMPLETE CBC W/AUTO DIFF WBC: CPT

## 2021-04-22 PROCEDURE — 82948 REAGENT STRIP/BLOOD GLUCOSE: CPT

## 2021-04-22 PROCEDURE — 2060000000 HC ICU INTERMEDIATE R&B

## 2021-04-22 RX ORDER — ASPIRIN 325 MG
325 TABLET ORAL ONCE
Status: COMPLETED | OUTPATIENT
Start: 2021-04-23 | End: 2021-04-23

## 2021-04-22 RX ORDER — POTASSIUM CHLORIDE 20 MEQ/1
40 TABLET, EXTENDED RELEASE ORAL PRN
Status: DISCONTINUED | OUTPATIENT
Start: 2021-04-22 | End: 2021-04-23 | Stop reason: HOSPADM

## 2021-04-22 RX ORDER — FUROSEMIDE 10 MG/ML
40 INJECTION INTRAMUSCULAR; INTRAVENOUS 2 TIMES DAILY
Status: DISCONTINUED | OUTPATIENT
Start: 2021-04-22 | End: 2021-04-23

## 2021-04-22 RX ORDER — NITROGLYCERIN 0.4 MG/1
0.4 TABLET SUBLINGUAL EVERY 5 MIN PRN
Status: DISCONTINUED | OUTPATIENT
Start: 2021-04-22 | End: 2021-04-23 | Stop reason: HOSPADM

## 2021-04-22 RX ORDER — POTASSIUM CHLORIDE 7.45 MG/ML
10 INJECTION INTRAVENOUS PRN
Status: DISCONTINUED | OUTPATIENT
Start: 2021-04-22 | End: 2021-04-23 | Stop reason: HOSPADM

## 2021-04-22 RX ORDER — SODIUM CHLORIDE 0.9 % (FLUSH) 0.9 %
5-40 SYRINGE (ML) INJECTION PRN
Status: DISCONTINUED | OUTPATIENT
Start: 2021-04-22 | End: 2021-04-23 | Stop reason: SDUPTHER

## 2021-04-22 RX ORDER — POTASSIUM CHLORIDE 20 MEQ/1
20 TABLET, EXTENDED RELEASE ORAL PRN
Status: DISCONTINUED | OUTPATIENT
Start: 2021-04-22 | End: 2021-04-23 | Stop reason: HOSPADM

## 2021-04-22 RX ORDER — SODIUM CHLORIDE 9 MG/ML
50 INJECTION, SOLUTION INTRAVENOUS CONTINUOUS
Status: DISCONTINUED | OUTPATIENT
Start: 2021-04-23 | End: 2021-04-23

## 2021-04-22 RX ORDER — SODIUM CHLORIDE 9 MG/ML
25 INJECTION, SOLUTION INTRAVENOUS PRN
Status: DISCONTINUED | OUTPATIENT
Start: 2021-04-22 | End: 2021-04-23 | Stop reason: HOSPADM

## 2021-04-22 RX ORDER — SODIUM CHLORIDE 0.9 % (FLUSH) 0.9 %
5-40 SYRINGE (ML) INJECTION EVERY 12 HOURS SCHEDULED
Status: DISCONTINUED | OUTPATIENT
Start: 2021-04-23 | End: 2021-04-23 | Stop reason: SDUPTHER

## 2021-04-22 RX ORDER — DIPHENHYDRAMINE HYDROCHLORIDE 50 MG/ML
50 INJECTION INTRAMUSCULAR; INTRAVENOUS ONCE
Status: DISCONTINUED | OUTPATIENT
Start: 2021-04-23 | End: 2021-04-23 | Stop reason: HOSPADM

## 2021-04-22 RX ADMIN — ATORVASTATIN CALCIUM 20 MG: 20 TABLET, FILM COATED ORAL at 20:09

## 2021-04-22 RX ADMIN — SODIUM CHLORIDE, PRESERVATIVE FREE 10 ML: 5 INJECTION INTRAVENOUS at 17:40

## 2021-04-22 RX ADMIN — CITALOPRAM 40 MG: 40 TABLET, FILM COATED ORAL at 11:44

## 2021-04-22 RX ADMIN — FUROSEMIDE 40 MG: 10 INJECTION, SOLUTION INTRAMUSCULAR; INTRAVENOUS at 17:38

## 2021-04-22 RX ADMIN — SODIUM CHLORIDE, PRESERVATIVE FREE 5 ML: 5 INJECTION INTRAVENOUS at 20:09

## 2021-04-22 RX ADMIN — POTASSIUM CHLORIDE 40 MEQ: 1500 TABLET, EXTENDED RELEASE ORAL at 13:19

## 2021-04-22 RX ADMIN — FUROSEMIDE 60 MG: 10 INJECTION, SOLUTION INTRAMUSCULAR; INTRAVENOUS at 09:45

## 2021-04-22 ASSESSMENT — PAIN DESCRIPTION - PAIN TYPE: TYPE: ACUTE PAIN

## 2021-04-22 ASSESSMENT — PAIN DESCRIPTION - FREQUENCY: FREQUENCY: INTERMITTENT

## 2021-04-22 ASSESSMENT — PAIN SCALES - GENERAL: PAINLEVEL_OUTOF10: 0

## 2021-04-22 ASSESSMENT — PAIN DESCRIPTION - LOCATION: LOCATION: CHEST

## 2021-04-22 NOTE — CARE COORDINATION
4/22/21, 1:54 PM EDT  DISCHARGE PLANNING EVALUATION:    Felipe Goncalves       Admitted: 4/21/2021/ [address] day: 1   Location: [facility]- Reason for admit: Chest pain [R07.9]  Unstable angina ([facility] [I20.0]   PMH:  has a past medical history of Asthma, CAD (coronary artery disease), CHF (congestive heart failure) ([facility] Diabetes mellitus ([facility] GERD (gastroesophageal reflux disease), History of blood transfusion, and Hyperlipidemia. Procedure: No.  Barriers to Discharge: To ER with SOB and CP. Pt states her Cardiologist is in [place]. Hx hypertropic cardiomyopathy and systolic HF, cardiac arrest, VT/VF and s/p ICD. [facility] consulted. Echo completed. Troponin bumped x 2. Possible NSTEMI secondary to CHF exacerbation. BNP elevated. Cardiology planning left heart cath. PCP: Irina Ahmadi MD  Readmission Risk Score: 11%    Patient Goals/Plan/Treatment Preferences: Met with pt today. She is from home with spouse and no services or DME. She is self sufficient although she does not drive much now due to cataracts. She has a PCP, has transportation and no issues getting meds. No discharge needs voiced at this time. Transportation/Food Security/Housekeeping Addressed:  No issues identified.

## 2021-04-22 NOTE — PROGRESS NOTES
Cardiology Progress Note      Patient:  Reynaldo Coe  YOB: 1960  MRN: [id number]   Acct: [de-identified]  [facility] Date:  4/21/2021  Primary Cardiologist: none  Seen by Dr. Rui Castel    Per prior cardiology consult note-  Reason for Consultation:  Chest pain        History Of Present Illness:    63 y.o. pleasant female c hx of HOCM s/p surgical myoectomy in 1990 ([place], [place]), LV dysfunction EF 40%, hx of Cardiac arrest in 2004 s/p ICD, DM, HLD  who presented to the hospital with complaints of chest pressure and shortness of breath. As per patient the symptoms started about 1 month ago and progressive gotten worse. She has been taking more lasix lately for leg swelling and shortness of breath. She has more been having more productive cough while lying down. She had similar symptoms in the past for which lasix helped. She had cardiac cath 5 years ago which apparently just showed microvascular disease. She started about 6 weeks ago, she started entresto. Ekg shows A-S, V-P. ProBNP is 4371. She was taking Lasix 20mg daily .     Subjective (Events in last 24 hours):    Pt is from [facility]- her cardiologist is there- she spends time here prn but mostly lives in [facility]    Worsening SOB x 2 months - no CP - no hx CHERYL per her   No recent illness     She states she has been urinating a lot and is feeling better but still has some SOB and abd swelling     VSS  Tele Paced     Pt concerned about diuretics and kidney failure (kidneys good - GFR 86)      Discussed cardiac cath DT akinetic LV -- per pt EF has been 40% for years - pt hesitant but does understand the reasoning for such     Discussed with primary attending and nurse    Objective:   BP (!) 107/44   Pulse 71   Temp 98.1 °F (36.7 °C) (Oral)   Resp 18   Ht 4' 9\" (1.448 m)   Wt 143 lb 1.6 oz (64.9 kg)   SpO2 97%   BMI 30.97 kg/m²        TELEMETRY: paced     Physical Exam:  General Appearance: alert and oriented to person, place and time, in no acute distress  Cardiovascular: normal rate, regular rhythm, normal S1 and S2, no murmurs, rubs, clicks, or gallops, distal pulses intact,   Pulmonary/Chest: clear upper - diminished with crackles lower bases to auscultation bilaterally- no wheezes, rales or rhonchi, normal air movement, no respiratory distress  Abdomen: soft, non-tender, non-distended, normal bowel sounds, no masses Extremities: no cyanosis, clubbing or edema, pulses present   Skin: warm and dry, no rash or erythema  Head: normocephalic and atraumatic  Musculoskeletal: normal range of motion, no joint swelling, deformity or tenderness  Neurological: alert, oriented, normal speech, no focal findings or movement disorder noted    Medications:    atorvastatin  20 mg Oral Daily    carvedilol  12.5 mg Oral BID WC    sodium chloride flush  5-40 mL Intravenous 2 times per day    enoxaparin  40 mg Subcutaneous Q24H    citalopram  40 mg Oral Daily    insulin lispro  0-6 Units Subcutaneous TID WC    insulin lispro  0-3 Units Subcutaneous Nightly    furosemide  60 mg Intravenous BID      sodium chloride      dextrose       potassium chloride, 20 mEq, PRN  potassium chloride, 40 mEq, PRN    Or  potassium alternative oral replacement, 40 mEq, PRN    Or  potassium chloride, 10 mEq, PRN  sodium chloride flush, 5-40 mL, PRN  sodium chloride, 25 mL, PRN  promethazine, 12.5 mg, Q6H PRN    Or  ondansetron, 4 mg, Q6H PRN  polyethylene glycol, 17 g, Daily PRN  acetaminophen, 650 mg, Q6H PRN    Or  acetaminophen, 650 mg, Q6H PRN  busPIRone, 5 mg, Daily PRN  glucose, 15 g, PRN  dextrose, 12.5 g, PRN  glucagon (rDNA), 1 mg, PRN  dextrose, 100 mL/hr, PRN        Diagnostics:  EK2021 10:26:56 [facility] ROUTINE RETRIEVAL  Atrial-sensed ventricular-paced rhythm with occasional Premature ventricular complexes  Abnormal ECG  When compared with ECG of 30-SEP-2013 16:42,  Premature ventricular complexes are now Present  Ventricular rate has pericardial effusion. Pleural Effusion   No evidence of pleural effusion. Aorta / Great Vessels   -Aortic root dimension within normal limits. -IVC not well visualized. Left Heart Cath: unknown - done in [place] - per pt no stents needed     Lab Data:    Cardiac Enzymes:  No results for input(s): CKTOTAL, CKMB, CKMBINDEX, TROPONINI in the last 72 hours.     CBC:   Lab Results   Component Value Date    WBC 5.9 04/22/2021    RBC 3.87 04/22/2021    HGB 11.2 04/22/2021    HCT 35.6 04/22/2021     04/22/2021       CMP:    Lab Results   Component Value Date     04/22/2021    K 3.1 04/22/2021     04/22/2021    CO2 27 04/22/2021    BUN 22 04/22/2021    CREATININE 0.7 04/22/2021    GFRAA >60 03/04/2020    LABGLOM 86 04/22/2021    GLUCOSE 104 04/22/2021    CALCIUM 9.3 04/22/2021       Hepatic Function Panel:    Lab Results   Component Value Date    ALKPHOS 72 09/02/2020    ALT 33 09/02/2020    AST 26 09/02/2020    PROT 7.3 09/02/2020    BILITOT 1.54 09/02/2020    BILIDIR 0.33 09/02/2020    IBILI 1.21 09/02/2020    LABALBU 4.5 09/02/2020       Magnesium:    Lab Results   Component Value Date    MG 2.0 04/22/2021       PT/INR:    Lab Results   Component Value Date    INR 1.01 09/26/2018       HgBA1c:    Lab Results   Component Value Date    LABA1C 5.6 04/21/2021       FLP:    Lab Results   Component Value Date    TRIG 112 04/21/2021    HDL 44 04/21/2021    LDLCALC 56 04/21/2021       TSH:    Lab Results   Component Value Date    TSH 1.53 03/04/2020         Assessment:    Acute on chronic diastolic chf NYHC4 -- improving     Mild trop elevation - not NSTEMI    Mild to moderate MR  EF 40% (longstanding per pt)    Hx HOCM with myomectomy 1990  Hx SCD 2004 - s\p ICD     HLP      Plan:  · Will obtain echo and cath from [place]  · decrease lasix to 40 mg iv BID   ·   Salt restriction 2 gm ( 2,000 mg) daily   · Fluid restriction 2 L daily    Weigh yourself daily: Should you gain 2-3 pounds in 1 days time or 3-5 pounds in 2 days time-- call your primary cardiologist for further recommendations      · Plan for cath tomorrow 9am  · We will follow after cath          Electronically signed by SUZE Nunez CNP on 4/22/2021 at 1:25 PM

## 2021-04-22 NOTE — ADT AUTH CERT
Patient Demographics            Name    Patient ID    SSN    Gender Identity    Birth Date      Viviane Avina [id number]  Female [birth year] (61 yrs)           Address    Phone    Email    Employer            [address]   [address] [facility] [address] [phone] (u) [phone] Sahil [email or web address]) [email or web address]   [address] [facility]    Occupation    Emp Status            [employer] -- [employer] Time            Reg Status    PCP    Date Last Verified    Next Review Date            Verified Jim Drake MD   [phone] 21            Admission Date    Discharge Date    Admitting Provider                  21 -- Osei Beltran MD             Marital Status    Episcopalian                         Other              Emergency Contact 1    Emergency Contact 2      Remonia Inch [phone] Dylan Comings) [facility] [phone] [facility] [address]   [address] [place] [address]   [phone] ()   [phone] (M)             Utilization Reviews               Physician advisor recommends inpatient by Stacy Maradiaga RN             Review Entered    Review Status      2021 14:00 In Primary            Criteria Review          slr keep in    We recommend that the following pt's current hospitalization under Inpatient status is APPROPRIATE .        Name: Arlette Salgado   : 1960   CSN: [id number]   INSURANCE: [facility]      63 F  Chest pain  SOB    Acute on Chronic HFrEF with known Hx of Hypertrophic Cardiomyopathy   Chest Pain with Elevated Troponin concerning for NSTEMI    IV heparin drip  IV Lasix      MCG criteria applies Y      This chart was reviewed at 1:44 PM 2021    [employer]   CELL : [phone]                [facility] - Care Day 1 (2021) by Stacy Maradiaga RN             Review Entered    Review Status      2021 10:43 Completed            Criteria Review           Care Day: 1 Care Date: 4/21/2021 Level of Care: Intermediate Care      Guideline Day 1      Level Of Care      (X) Intermediate care [B] or telemetry      4/22/2021 10:43 AM EDT by Darrell Castro        [facility]      (X) Readmission Risk Assessment      4/22/2021 10:43 AM EDT by Darrell aCstro        11%      Clinical Status      (X) * Clinical Indications met [C]      4/22/2021 10:43 AM EDT by Darrell Castro        met      Interventions      (X) Cardiac monitoring      4/22/2021 10:43 AM EDT by Darrell Castro        tele      (X) ECG      4/22/2021 10:43 AM EDT by Darrell Castro        noted      (X) Cardiac biomarkers      4/22/2021 10:43 AM EDT by Darrell Castro        noted      Medications      (X) Anticoagulants      4/22/2021 10:43 AM EDT by Darrell Castro        lovenox      (X) Antiplatelet agents (eg, aspirin, clopidogrel, ticagrelor)      4/22/2021 10:43 AM EDT by Darrell Castro        ASA      (X) Possible statin      4/22/2021 10:43 AM EDT by Christiano Aguila      * Milestone              [facility] - Clinical Indications for Admission to Inpatient Care by Mauri Morin RN             Review Entered    Review Status      4/22/2021 10:42 Completed            Criteria Review           Clinical Indications for Admission to Inpatient Care      Most Recent : Darrell Castor Most Recent Date: 4/22/2021 10:42 AM EDT      (X) Admission is indicated by  1 or more  of the following  (1) (2) (3) (4):         (X) Evidence of MI (eg, cardiac biomarkers positive, ST-segment elevation on ECG). See Myocardial         Infarction guideline. 4/22/2021 10:42 AM EDT by Darrell Castro           troponin 0.014       Additional Notes      4/21/21        Patient presented to the ED with complaints of Shortness of breath            VS- temp 98, HR 70, RR 18, /54, O2 94%, pain 3/10            Labs- K 3.4, GFR 86, BNP 4371, Troponin 0.014; 0.012, Glucose 126,        CXR-   1.  Mild cardiomegaly and prominence of the left heart border, in this patient status post pacemaker placement and previous sternotomy. 2. Otherwise negative chest x-ray. EKG-   Atrial-sensed ventricular-paced rhythm with occasional Premature ventricular complexes   Abnormal ECG            Medical history- HLD, Asthma, CHF, CAD, DM, GERD            Home medications- Lipitor, Buspar, Coreg, Lasix, Glucophage, Entresto            ED notes-    Arlette Salgado is a 63 y.o. female who presents to the emergency department for evaluation of shortness of breath and chest pressure. Patient states that shortness of breath has been present for the past month and has been taking her Lasix 20 mg every day now. Previously was taking it prn. Patient's cardiologist is in [place] and advised her to come get evaluated in the ER. Patient states that the pressure heaviness started in the last few days and is substernal.  Does not radiate and is a 3 out of 10. She does have a history of hypertrophic cardiomyopathy and systolic heart failure. The patient has no other acute complaints at this time. Review of Systems    Constitutional: Negative for chills, diaphoresis and fever. Respiratory: Positive for shortness of breath. Negative for cough, choking, chest tightness and stridor. Cardiovascular: Positive for chest pain. Negative for palpitations and leg swelling. Gastrointestinal: Positive for abdominal distention. Negative for abdominal pain, constipation, diarrhea, nausea and vomiting. Genitourinary: Negative for dysuria, frequency, hematuria and urgency. Musculoskeletal: Negative for arthralgias, joint swelling and myalgias. Skin: Negative for color change, pallor and rash. Neurological: Negative for dizziness, syncope and light-headedness. Hematological: Negative for adenopathy. Does not bruise/bleed easily. Psychiatric/Behavioral: Negative for confusion. The patient is not nervous/anxious. Final diagnoses:   Hypertrophic cardiomyopathy ([facility] [address] [address]   Elevated troponin   Acute on chronic systolic heart failure ([facility]                   H&P-   63 y.o. female who presented to [facility] with one week history of worsening chest pain and sob. Pt reports to have known hypertrophic cardiomyopathy, follows up with Cardiologist in [place]. Pt has been having increasing episodes of pressure/heaviness in her chest, substernal with no radiation. Associated with sob, dizziness and lightheadedness. Chest pain comes and goes, worse with activity and improves with rest. Pt has also been complaining of increasing sob as well, has been needing to sleep on multiple pillows each day. Pt has been taking her Lasix pills at home each day as opposed to prn. Denies any recent fevers, chills, cough, congestion, abdominal pain, diarrhea, nausea, vomiting or focal neurological deficits. In the ED, HD stable on RA with Pulse Ox 96%. Labs remarkable for elevated BNP and Troponin. CXR showing cardiomegaly. Assessment/Plan:       1. Chest Pain with Elevated Troponin concerning for NSTEMI: will start on Heparin drip, ASA, BB, and Statin. Cont to trend troponin. Consult Cardiology. NPO after midnight. Pending 2D Echo.        2. Acute on Chronic HFrEF with known Hx of Hypertrophic Cardiomyopathy : elevated BNP with increased dyspnea. CXR showing cardiomegaly. Pending 2D Echo. Gentl IV lasix 20 qd. Strict I/Os. Daily weights       3. Anxiety: resume home medications        4. Elevated Troponin: concerning for NSTEMI, cont to trend. Consult Cardiology. 5. Type 2 DM: hold Metformin, cont Humalog Sliding Scale. Diabetic Diet. Accu-checks                   Cardiology-   63 y.o. pleasant female c hx of HOCM s/p surgical myoectomy in 1990 ([address], [place]), LV dysfunction EF 40%, hx of Cardiac arrest in 2004 s/p ICD, DM, HLD who presented to the hospital with complaints of chest pressure and shortness of breath. As per patient the symptoms started about 1 month ago and progressive gotten worse. She has been taking more lasix lately for leg swelling and shortness of breath. She has more been having more productive cough while lying down. She had similar symptoms in the past for which lasix helped. She had cardiac cath 5 years ago which apparently just showed microvascular disease. She started about 6 weeks ago, she started entresto. Ekg shows A-S, V-P. ProBNP is 4371. She was taking Lasix 20mg daily . Chest pain   Unstable angina (HCC)       Acute on chronic systolic CHF    Elevated troponins   LV dysfunction EF 40% about 2 months ago in [place]   HOC s/p septal myectomy    VT/Vfib s/p ICD   DM   HLD       Telemetry   IV Lasix 80mg daily   Supplement K   Keep K~4, Mg~2   Monitor renal function   Get 2D Echo for LVSF/WMA   Further recs based on results and clinical course                      Patient received ASA 324mg PO x1, Lipitor 20mg PO x1, Celexa 40mg PO x1, Lovenox 40mg SC x1, Lasix 60mg IV x1, Humalog SC QID SS                  Last H&P Note          H&P by Nikhil Barnes MD at 4/21/2021 12:52 PM      Author: Nikhil Barnes MD Specialty: Internal Medicine Author Type: Physician   Filed: 4/21/2021  1:44 PM Date of Service: 4/21/2021 12:52 PM Status: Addendum   : Nikhil Barnes MD (Physician)   Related Notes: Original Note by Nikhil Barnes MD (Physician) filed at 4/21/2021  1:13 PM                      untitled image         History & Physical                Patient:  Flaca Manzanares    YOB: 1960         MRN: [id number]                                    Acct: [de-identified]         PCP: Thompson Rutledge MD         Date of Admission: 4/21/2021         Date of Service: Pt seen/examined on 4/21/2021     and Admitted to Inpatient with expected LOS greater than two midnights due to medical therapy.          Chief Complaint:  Chest pain, sob               History Of Present Illness: daily (with meals). Yes     Historical Provider, MD       atorvastatin (LIPITOR) 10 MG tablet     Take 20 mg by mouth daily                  Yes     Historical Provider, MD       furosemide (LASIX) 20 MG tablet     Take 20 mg by mouth as needed                       Historical Provider, MD       busPIRone (BUSPAR) 5 MG tablet     Take 5 mg by mouth as needed                       Historical Provider, MD                 Allergies:  Patient has no known allergies. Social History:         Social History                    Socioeconomic History            Marital status:     Legally                    Spouse name:     None            Number of children:     None            Years of education:     None            Highest education level:     None       Occupational History            None       Social Needs            Financial resource strain:     None            Food insecurity                   Worry:     None                   Inability:     None            Transportation needs                   Medical:     None                   Non-medical:     None       Tobacco Use            Smoking status:     Never Smoker       Substance and Sexual Activity            Alcohol use:      Yes                   Comment: Socially             Drug use:     No            Sexual activity:     None       Lifestyle            Physical activity                   Days per week:     None                   Minutes per session:     None            Stress:     None       Relationships            Social connections                   Talks on phone:     None                   Gets together:     None                   Attends [employer] service:     None                   Active member of club or organization:     None                   Attends meetings of clubs or organizations:     None                   Relationship status:     None            Intimate partner violence                   Fear of current or ex partner:     None                   Emotionally abused:     None                   Physically abused:     None                   Forced sexual activity:     None       Other Topics     Concern            None       Social History Narrative            None                 Family History:            Reviewed in detail and negative for DM, CAD, Cancer, CVA. Positive as follows:                         Problem     Relation     Age of Onset            Heart Disease     Mother                  Other     Mother                       Parkinsons disease            Heart Disease     Father                  Cancer     Sister                  Heart Disease     Brother                       Diet:    No diet orders on file         REVIEW OF SYSTEMS:   Pertinent positives as noted in the HPI. All other systems reviewed and negative. PHYSICAL EXAM:         /65   Pulse 70   Temp 98 °F (36.7 °C) (Oral)   Resp 18   Ht 4' 9\" (1.448 m)   Wt 144 lb (65.3 kg)   SpO2 96%   BMI 31.16 kg/m²          General appearance:  No apparent distress, appears stated age and cooperative. HEENT:  Normal cephalic, atraumatic without obvious deformity. Pupils equal, round, and reactive to light. Extra ocular muscles intact. Conjunctivae/corneas clear. Neck: Supple, with full range of motion. No jugular venous distention. Trachea midline. Respiratory:  Normal respiratory effort. Clear to auscultation, bilaterally without Rales/Wheezes/Rhonchi. Cardiovascular:  Regular rate and rhythm with normal S1/S2 without murmurs, rubs or gallops. Abdomen: Soft, non-tender, non-distended with normal bowel sounds. Musculoskeletal:  No clubbing, cyanosis or edema bilaterally. Full range of motion without deformity. Skin: Skin color, texture, turgor normal.  No rashes or lesions. Neurologic:  Neurovascularly intact without any focal sensory/motor deficits.  Cranial nerves: II-XII intact, grossly non-focal.    Psychiatric:  Alert and oriented, thought content appropriate, normal insight    Capillary Refill: Brisk,< 3 seconds     Peripheral Pulses: +2 palpable, equal bilaterally               Labs:                Recent Labs             04/21/21    1020       WBC     6.2       HGB     12.3       HCT     39.0       PLT     175                  Recent Labs             04/21/21    1020       NA     142       K     3.4*       CL     105       CO2     25       BUN     20       CREATININE     0.7       CALCIUM     9.9            No results for input(s): AST, ALT, BILIDIR, BILITOT, ALKPHOS in the last 72 hours. No results for input(s): INR in the last 72 hours. No results for input(s): Rejeana Kemps in the last 72 hours. Urinalysis:      No results found for: NITRU, WBCUA, BACTERIA, RBCUA, BLOODU, SPECGRAV, GLUCOSEU         Intake & Output:    No intake/output data recorded. No intake/output data recorded. Radiology:          CXR: I have reviewed the CXR with the following interpretation: see below            XR CHEST PORTABLE       Final Result       1. Mild cardiomegaly and prominence of the left heart border, in this patient status post pacemaker placement and previous sternotomy. 2. Otherwise negative chest x-ray. **This report has been created using voice recognition software. It may contain minor errors which are inherent in voice recognition technology. **               Final report electronically signed by DR Caroline Duron on 4/21/2021 11:17 AM                         ASSESSMENT:                Active Hospital Problems             Diagnosis     Date Noted            Chest pain [R07.9]     04/21/2021                 Assessment/Plan:         1. Chest Pain with Elevated Troponin concerning for NSTEMI: will start on Heparin drip, ASA, BB, and Statin. Cont to trend troponin. Consult Cardiology. NPO after midnight. Pending 2D Echo. 2. Acute on Chronic HFrEF with known Hx of Hypertrophic Cardiomyopathy : elevated BNP with increased dyspnea. CXR showing cardiomegaly. Pending 2D Echo. Gentl IV lasix 20 qd. Strict I/Os. Daily weights         3. Anxiety: resume home medications          4. Elevated Troponin: concerning for NSTEMI, cont to trend. Consult Cardiology. 5. Type 2 DM: hold Metformin, cont Humalog Sliding Scale. Diabetic Diet. Accu-checks                    Thank you Martell Gonzalez MD for the opportunity to be involved in this patient's care.          Electronically signed by Lucinda Moura MD on 4/21/2021 at 12:52 PM

## 2021-04-22 NOTE — PROGRESS NOTES
Hospitalist Progress Note    Patient:  Willi Azevedo      Unit/Bed:4A-15/015-A    YOB: 1960    MRN: [id number]       Acct: [de-identified]     PCP: Benny oMser MD    Date of Admission: 4/21/2021    Active Hospital Problems    Diagnosis Date Noted    Chest pain [R07.9] 04/21/2021    Unstable angina ([address] [I20.0] 04/21/2021     Assessment/Plan:     1. Elevated Troponin likely NSTEMI Type 2: no ECG changes, troponin down trended. Likely 2/2 to CHF exacerbation. Cont to trend. Plan for [facility] per Cardiology.      2. Acute on Chronic HFrEF: elevated BNP with increased dyspnea. CXR showing cardiomegaly. 2D Echo showing EF 40% with anterior wall hypokinesis. Cont IV lasix 60 qd. Strict I/Os. Daily weights    3. Hx of HOCM s/p Septal Myectomy: noted      4. Anxiety: resume home medications      5. Type 2 DM: hold Metformin, cont Humalog Sliding Scale. Diabetic Diet. Accu-checks       Dispo: cont diuresis, spoke with Dr. Isabel Islas. Plan for [facility]         Chief Complaint: Chest pain, sob     Hospital Course: 63 y.o. female who presented to [facility] with one week history of worsening chest pain and sob. Pt reports to have known hypertrophic cardiomyopathy, follows up with Cardiologist in [place]. Pt has been having increasing episodes of pressure/heaviness in her chest, substernal with no radiation. Associated with sob, dizziness and lightheadedness. Chest pain comes and goes, worse with activity and improves with rest. Pt has also been complaining of increasing sob as well, has been needing to sleep on multiple pillows each day. Pt has been taking her Lasix pills at home each day as opposed to prn. Denies any recent fevers, chills, cough, congestion, abdominal pain, diarrhea, nausea, vomiting or focal neurological deficits.     In the ED, HD stable on RA with Pulse Ox 96%. Labs remarkable for elevated BNP and Troponin. CXR showing cardiomegaly. Subjective: no acute events overnight.  Pt reports feeling much better this morning, breathing improved drastically. No chest pain or sob. Tolerating PO intake. No fevers or chills. Medications:  Reviewed    Infusion Medications    sodium chloride      dextrose       Scheduled Medications    atorvastatin  20 mg Oral Daily    carvedilol  12.5 mg Oral BID WC    sodium chloride flush  5-40 mL Intravenous 2 times per day    enoxaparin  40 mg Subcutaneous Q24H    citalopram  40 mg Oral Daily    insulin lispro  0-6 Units Subcutaneous TID WC    insulin lispro  0-3 Units Subcutaneous Nightly    furosemide  60 mg Intravenous BID     PRN Meds: potassium chloride, potassium chloride **OR** potassium alternative oral replacement **OR** potassium chloride, sodium chloride flush, sodium chloride, promethazine **OR** ondansetron, polyethylene glycol, acetaminophen **OR** acetaminophen, busPIRone, glucose, dextrose, glucagon (rDNA), dextrose      Intake/Output Summary (Last 24 hours) at 4/22/2021 1340  Last data filed at 4/22/2021 0358  Gross per 24 hour   Intake 190 ml   Output --   Net 190 ml       Diet:  DIET LOW SODIUM 2 GM; 1800 ml    Exam:  BP (!) 107/44   Pulse 71   Temp 98.1 °F (36.7 °C) (Oral)   Resp 18   Ht 4' 9\" (1.448 m)   Wt 143 lb 1.6 oz (64.9 kg)   SpO2 97%   BMI 30.97 kg/m²     General appearance: No apparent distress, appears stated age and cooperative. HEENT: Pupils equal, round, and reactive to light. Conjunctivae/corneas clear. Neck: Supple, with full range of motion. No jugular venous distention. Trachea midline. Respiratory:  Normal respiratory effort. Clear to auscultation, bilaterally without Rales/Wheezes/Rhonchi. Cardiovascular: Regular rate and rhythm with normal S1/S2 without murmurs, rubs or gallops. Abdomen: Soft, non-tender, non-distended with normal bowel sounds. Musculoskeletal: passive and active ROM x 4 extremities. Skin: Skin color, texture, turgor normal.  No rashes or lesions.   Neurologic:  Neurovascularly intact without any focal sensory/motor deficits. Cranial nerves: II-XII intact, grossly non-focal.  Psychiatric: Alert and oriented, thought content appropriate, normal insight  Capillary Refill: Brisk,< 3 seconds   Peripheral Pulses: +2 palpable, equal bilaterally       Labs:   Recent Labs     04/22/21  0305   WBC 5.9   HGB 11.2*   HCT 35.6*        Recent Labs     04/22/21  0305      K 3.1*      CO2 27   BUN 22   CREATININE 0.7   CALCIUM 9.3     No results for input(s): AST, ALT, BILIDIR, BILITOT, ALKPHOS in the last 72 hours. No results for input(s): INR in the last 72 hours. No results for input(s): Ariana Burdock in the last 72 hours. Urinalysis:    No results found for: Sylvester Low, [address] [facility] 298, [address] [place] [address] 89., [facility], [facility] 994    Radiology:   All imaging reviewed     Diet: DIET LOW SODIUM 2 GM; 1800 ml      Code Status: Full Code            Electronically signed by Taras Umaña MD on 4/22/2021 at 1:40 PM

## 2021-04-23 ENCOUNTER — APPOINTMENT (OUTPATIENT)
Dept: CARDIAC CATH/INVASIVE PROCEDURES | Age: 61
DRG: 287 | End: 2021-04-23
Payer: COMMERCIAL

## 2021-04-23 ENCOUNTER — APPOINTMENT (OUTPATIENT)
Dept: GENERAL RADIOLOGY | Age: 61
DRG: 287 | End: 2021-04-23
Payer: COMMERCIAL

## 2021-04-23 VITALS
HEART RATE: 70 BPM | TEMPERATURE: 97.6 F | BODY MASS INDEX: 30.16 KG/M2 | SYSTOLIC BLOOD PRESSURE: 112 MMHG | RESPIRATION RATE: 16 BRPM | OXYGEN SATURATION: 98 % | HEIGHT: 57 IN | DIASTOLIC BLOOD PRESSURE: 56 MMHG | WEIGHT: 139.8 LBS

## 2021-04-23 LAB
ABO: NORMAL
ALBUMIN SERPL-MCNC: 4.3 G/DL (ref 3.5–5.1)
ALP BLD-CCNC: 81 U/L (ref 38–126)
ALT SERPL-CCNC: 22 U/L (ref 11–66)
ANION GAP SERPL CALCULATED.3IONS-SCNC: 13 MEQ/L (ref 8–16)
ANION GAP SERPL CALCULATED.3IONS-SCNC: 13 MEQ/L (ref 8–16)
ANTIBODY SCREEN: NORMAL
APTT: 28.6 SECONDS (ref 22–38)
AST SERPL-CCNC: 18 U/L (ref 5–40)
BASOPHILS # BLD: 0.9 %
BASOPHILS ABSOLUTE: 0 THOU/MM3 (ref 0–0.1)
BILIRUB SERPL-MCNC: 3.5 MG/DL (ref 0.3–1.2)
BUN BLDV-MCNC: 21 MG/DL (ref 7–22)
BUN BLDV-MCNC: 21 MG/DL (ref 7–22)
CALCIUM SERPL-MCNC: 9.7 MG/DL (ref 8.5–10.5)
CALCIUM SERPL-MCNC: 9.7 MG/DL (ref 8.5–10.5)
CHLORIDE BLD-SCNC: 103 MEQ/L (ref 98–111)
CHLORIDE BLD-SCNC: 104 MEQ/L (ref 98–111)
CO2: 27 MEQ/L (ref 23–33)
CO2: 28 MEQ/L (ref 23–33)
CREAT SERPL-MCNC: 0.6 MG/DL (ref 0.4–1.2)
CREAT SERPL-MCNC: 0.6 MG/DL (ref 0.4–1.2)
EKG ATRIAL RATE: 70 BPM
EKG P-R INTERVAL: 166 MS
EKG Q-T INTERVAL: 522 MS
EKG QRS DURATION: 178 MS
EKG QTC CALCULATION (BAZETT): 563 MS
EKG R AXIS: 103 DEGREES
EKG T AXIS: 89 DEGREES
EKG VENTRICULAR RATE: 70 BPM
EOSINOPHIL # BLD: 2.2 %
EOSINOPHILS ABSOLUTE: 0.1 THOU/MM3 (ref 0–0.4)
ERYTHROCYTE [DISTWIDTH] IN BLOOD BY AUTOMATED COUNT: 14.1 % (ref 11.5–14.5)
ERYTHROCYTE [DISTWIDTH] IN BLOOD BY AUTOMATED COUNT: 46.4 FL (ref 35–45)
GFR SERPL CREATININE-BSD FRML MDRD: > 90 ML/MIN/1.73M2
GFR SERPL CREATININE-BSD FRML MDRD: > 90 ML/MIN/1.73M2
GLUCOSE BLD-MCNC: 119 MG/DL (ref 70–108)
GLUCOSE BLD-MCNC: 147 MG/DL (ref 70–108)
GLUCOSE BLD-MCNC: 94 MG/DL (ref 70–108)
GLUCOSE BLD-MCNC: 94 MG/DL (ref 70–108)
HCT VFR BLD CALC: 38.5 % (ref 37–47)
HEMOGLOBIN: 12.3 GM/DL (ref 12–16)
IMMATURE GRANS (ABS): 0.01 THOU/MM3 (ref 0–0.07)
IMMATURE GRANULOCYTES: 0.2 %
INR BLD: 1.14 (ref 0.85–1.13)
LYMPHOCYTES # BLD: 31.3 %
LYMPHOCYTES ABSOLUTE: 1.7 THOU/MM3 (ref 1–4.8)
MAGNESIUM: 2.2 MG/DL (ref 1.6–2.4)
MAGNESIUM: 2.2 MG/DL (ref 1.6–2.4)
MCH RBC QN AUTO: 29.1 PG (ref 26–33)
MCHC RBC AUTO-ENTMCNC: 31.9 GM/DL (ref 32.2–35.5)
MCV RBC AUTO: 91 FL (ref 81–99)
MONOCYTES # BLD: 10 %
MONOCYTES ABSOLUTE: 0.6 THOU/MM3 (ref 0.4–1.3)
NUCLEATED RED BLOOD CELLS: 0 /100 WBC
PLATELET # BLD: 162 THOU/MM3 (ref 130–400)
PMV BLD AUTO: 11.5 FL (ref 9.4–12.4)
POTASSIUM REFLEX MAGNESIUM: 3.2 MEQ/L (ref 3.5–5.2)
POTASSIUM SERPL-SCNC: 3.2 MEQ/L (ref 3.5–5.2)
RBC # BLD: 4.23 MILL/MM3 (ref 4.2–5.4)
RH FACTOR: NORMAL
SEG NEUTROPHILS: 55.4 %
SEGMENTED NEUTROPHILS ABSOLUTE COUNT: 3 THOU/MM3 (ref 1.8–7.7)
SODIUM BLD-SCNC: 143 MEQ/L (ref 135–145)
SODIUM BLD-SCNC: 145 MEQ/L (ref 135–145)
TOTAL PROTEIN: 6.9 G/DL (ref 6.1–8)
WBC # BLD: 5.5 THOU/MM3 (ref 4.8–10.8)

## 2021-04-23 PROCEDURE — C1894 INTRO/SHEATH, NON-LASER: HCPCS

## 2021-04-23 PROCEDURE — 93010 ELECTROCARDIOGRAM REPORT: CPT | Performed by: INTERNAL MEDICINE

## 2021-04-23 PROCEDURE — 2500000003 HC RX 250 WO HCPCS

## 2021-04-23 PROCEDURE — 6370000000 HC RX 637 (ALT 250 FOR IP): Performed by: INTERNAL MEDICINE

## 2021-04-23 PROCEDURE — B2111ZZ FLUOROSCOPY OF MULTIPLE CORONARY ARTERIES USING LOW OSMOLAR CONTRAST: ICD-10-PCS | Performed by: INTERNAL MEDICINE

## 2021-04-23 PROCEDURE — 99232 SBSQ HOSP IP/OBS MODERATE 35: CPT | Performed by: NURSE PRACTITIONER

## 2021-04-23 PROCEDURE — B2151ZZ FLUOROSCOPY OF LEFT HEART USING LOW OSMOLAR CONTRAST: ICD-10-PCS | Performed by: INTERNAL MEDICINE

## 2021-04-23 PROCEDURE — 85610 PROTHROMBIN TIME: CPT

## 2021-04-23 PROCEDURE — C1769 GUIDE WIRE: HCPCS

## 2021-04-23 PROCEDURE — 4A023N7 MEASUREMENT OF CARDIAC SAMPLING AND PRESSURE, LEFT HEART, PERCUTANEOUS APPROACH: ICD-10-PCS | Performed by: INTERNAL MEDICINE

## 2021-04-23 PROCEDURE — 2500000003 HC RX 250 WO HCPCS: Performed by: INTERNAL MEDICINE

## 2021-04-23 PROCEDURE — 86901 BLOOD TYPING SEROLOGIC RH(D): CPT

## 2021-04-23 PROCEDURE — 6370000000 HC RX 637 (ALT 250 FOR IP): Performed by: NURSE PRACTITIONER

## 2021-04-23 PROCEDURE — 99239 HOSP IP/OBS DSCHRG MGMT >30: CPT | Performed by: INTERNAL MEDICINE

## 2021-04-23 PROCEDURE — 6360000004 HC RX CONTRAST MEDICATION: Performed by: INTERNAL MEDICINE

## 2021-04-23 PROCEDURE — 36415 COLL VENOUS BLD VENIPUNCTURE: CPT

## 2021-04-23 PROCEDURE — 6360000002 HC RX W HCPCS

## 2021-04-23 PROCEDURE — 93458 L HRT ARTERY/VENTRICLE ANGIO: CPT

## 2021-04-23 PROCEDURE — 85025 COMPLETE CBC W/AUTO DIFF WBC: CPT

## 2021-04-23 PROCEDURE — 93458 L HRT ARTERY/VENTRICLE ANGIO: CPT | Performed by: INTERNAL MEDICINE

## 2021-04-23 PROCEDURE — 2580000003 HC RX 258: Performed by: NURSE PRACTITIONER

## 2021-04-23 PROCEDURE — 93005 ELECTROCARDIOGRAM TRACING: CPT | Performed by: NURSE PRACTITIONER

## 2021-04-23 PROCEDURE — 80053 COMPREHEN METABOLIC PANEL: CPT

## 2021-04-23 PROCEDURE — 86900 BLOOD TYPING SEROLOGIC ABO: CPT

## 2021-04-23 PROCEDURE — 82948 REAGENT STRIP/BLOOD GLUCOSE: CPT

## 2021-04-23 PROCEDURE — 85730 THROMBOPLASTIN TIME PARTIAL: CPT

## 2021-04-23 PROCEDURE — 86850 RBC ANTIBODY SCREEN: CPT

## 2021-04-23 PROCEDURE — 71046 X-RAY EXAM CHEST 2 VIEWS: CPT

## 2021-04-23 PROCEDURE — 83735 ASSAY OF MAGNESIUM: CPT

## 2021-04-23 RX ORDER — SODIUM CHLORIDE 0.9 % (FLUSH) 0.9 %
5-40 SYRINGE (ML) INJECTION EVERY 12 HOURS SCHEDULED
Status: DISCONTINUED | OUTPATIENT
Start: 2021-04-23 | End: 2021-04-23 | Stop reason: HOSPADM

## 2021-04-23 RX ORDER — SODIUM CHLORIDE 9 MG/ML
25 INJECTION, SOLUTION INTRAVENOUS PRN
Status: DISCONTINUED | OUTPATIENT
Start: 2021-04-23 | End: 2021-04-23 | Stop reason: HOSPADM

## 2021-04-23 RX ORDER — POTASSIUM CHLORIDE 20 MEQ/1
20 TABLET, EXTENDED RELEASE ORAL 2 TIMES DAILY WITH MEALS
Status: DISCONTINUED | OUTPATIENT
Start: 2021-04-23 | End: 2021-04-23 | Stop reason: HOSPADM

## 2021-04-23 RX ORDER — DEXTROSE, SODIUM CHLORIDE, AND POTASSIUM CHLORIDE 5; .45; .15 G/100ML; G/100ML; G/100ML
INJECTION INTRAVENOUS CONTINUOUS
Status: DISCONTINUED | OUTPATIENT
Start: 2021-04-23 | End: 2021-04-23 | Stop reason: HOSPADM

## 2021-04-23 RX ORDER — SPIRONOLACTONE 25 MG/1
25 TABLET ORAL DAILY
Status: DISCONTINUED | OUTPATIENT
Start: 2021-04-23 | End: 2021-04-23 | Stop reason: HOSPADM

## 2021-04-23 RX ORDER — SPIRONOLACTONE 25 MG/1
25 TABLET ORAL DAILY
Qty: 30 TABLET | Refills: 3 | Status: SHIPPED | OUTPATIENT
Start: 2021-04-23

## 2021-04-23 RX ORDER — ACETAMINOPHEN 325 MG/1
650 TABLET ORAL EVERY 4 HOURS PRN
Status: DISCONTINUED | OUTPATIENT
Start: 2021-04-23 | End: 2021-04-23 | Stop reason: HOSPADM

## 2021-04-23 RX ORDER — SODIUM CHLORIDE 0.9 % (FLUSH) 0.9 %
5-40 SYRINGE (ML) INJECTION PRN
Status: DISCONTINUED | OUTPATIENT
Start: 2021-04-23 | End: 2021-04-23 | Stop reason: HOSPADM

## 2021-04-23 RX ADMIN — POTASSIUM CHLORIDE 40 MEQ: 1500 TABLET, EXTENDED RELEASE ORAL at 05:38

## 2021-04-23 RX ADMIN — SODIUM CHLORIDE 50 ML/HR: 9 INJECTION, SOLUTION INTRAVENOUS at 05:34

## 2021-04-23 RX ADMIN — IOPAMIDOL 50 ML: 755 INJECTION, SOLUTION INTRAVENOUS at 09:20

## 2021-04-23 RX ADMIN — SPIRONOLACTONE 25 MG: 25 TABLET ORAL at 14:19

## 2021-04-23 RX ADMIN — POTASSIUM CHLORIDE, DEXTROSE MONOHYDRATE AND SODIUM CHLORIDE: 150; 5; 450 INJECTION, SOLUTION INTRAVENOUS at 09:40

## 2021-04-23 RX ADMIN — POTASSIUM CHLORIDE 20 MEQ: 1500 TABLET, EXTENDED RELEASE ORAL at 14:19

## 2021-04-23 RX ADMIN — CITALOPRAM 40 MG: 40 TABLET, FILM COATED ORAL at 14:19

## 2021-04-23 RX ADMIN — ASPIRIN 325 MG: 325 TABLET, FILM COATED ORAL at 08:25

## 2021-04-23 ASSESSMENT — PAIN SCALES - GENERAL: PAINLEVEL_OUTOF10: 0

## 2021-04-23 NOTE — PRE SEDATION
[facility]  Sedation/Analgesia History & Physical    Pt Name: Cherrie Guzman  Account number: [de-identified]  MRN: [id number]  YOB: 1960  Provider Performing Procedure: Rigo Kevin MD  Referring Provider: Isra Cash MD   Primary Care Physician: Mohan Ashford MD  Date: 4/23/2021    PRE-PROCEDURE    Code Status: FULL CODE  Brief History/Pre-Procedure Diagnosis:     Elevated Troponin, ? NSTEMI    Acute on chronic CHF  Cardiomyopathy  EF 40-45%   DM    Consent: : I have discussed with the patient risks, benefits, and alternatives (and relevant risks, benefits, and side effects related to alternatives or not receiving care), and likelihood of the success. The patient and/or representative appear to understand and agree to proceed. The discussion encompasses risks, benefits, and side effects related to the alternatives and the risks related to not receiving the proposed care, treatment, and services. The indication, risks and benefits of the procedure and possible therapeutic consequences and alternatives were discussed with the patient. The patient was given the opportunity to ask questions and to have them answered to his/her satisfaction. Risks of the procedure include but are not limited to the following: Bleeding, hematoma including retroperitoneal hemmorhage, infection, pain and discomfort, injury to the aorta and other blood vessels, rhythm disturbance, low blood pressure, myocardial infarction, stroke, kidney damage/failure, myocardial perforation, allergic reactions to sedatives/contrast material, loss of pulse/vascular compromise requiring surgery, aneurysm/pseudoaneurysm formation, possible loss of a limb/hand/leg, needing blood transfusion, requiring emergent open heart surgery or emergent coronary intervention, the need for intubation/respiratory support, the requirement for defibrillation/cardioversion, and death.  Alternatives to and omission of the suggested procedure were discussed. The patient had no further questions and wished to proceed; the consent form was signed. MEDICAL HISTORY  []ASHD/ANGINA/MI/CHF   []Hypertension  []Diabetes  []Hyperlipidemia  []Smoking  []Family Hx of ASHD  []Additional information:       has a past medical history of Asthma, CAD (coronary artery disease), CHF (congestive heart failure) ([facility] [address] Diabetes mellitus ([facility] [address] GERD (gastroesophageal reflux disease), History of blood transfusion, and Hyperlipidemia. SURGICAL HISTORY   has a past surgical history that includes other surgical history (09/28/12); other surgical history (2004); A-V cardiac pacemaker insertion; Breast surgery; and Endometrial ablation.   Additional information:       ALLERGIES   Allergies as of 04/21/2021    (No Known Allergies)     Additional information:       MEDICATIONS   Aspirin  [] 81 mg  [] 325 mg  [] None  Antiplatelet drug therapy use last 5 days  []No []Yes  Coumadin Use Last 5 Days []No []Yes  Other anticoagulant use last 5 days  []No []Yes    Current Facility-Administered Medications:     potassium chloride (KLOR-CON M) extended release tablet 20 mEq, 20 mEq, Oral, PRN, Shanna Riley MD    potassium chloride (KLOR-CON M) extended release tablet 40 mEq, 40 mEq, Oral, PRN, 40 mEq at 04/23/21 0538 **OR** potassium bicarb-citric acid (EFFER-K) effervescent tablet 40 mEq, 40 mEq, Oral, PRN **OR** potassium chloride 10 mEq/100 mL IVPB (Peripheral Line), 10 mEq, Intravenous, PRN, Shanna Riley MD    furosemide (LASIX) injection 40 mg, 40 mg, Intravenous, BID, Rylie DENG [facility] APRN - CNP, 40 mg at 04/22/21 1738    0.9 % sodium chloride infusion, 50 mL/hr, Intravenous, Continuous, SUZE Beard - CNP, Last Rate: 50 mL/hr at 04/23/21 0534, 50 mL/hr at 04/23/21 0534    0.9 % sodium chloride infusion, 25 mL, Intravenous, PRN, Vesna Sawyer APRN - CNP    aspirin tablet 325 mg, 325 mg, Oral, Once, Vesna Sawyer APRN - CNP    diphenhydrAMINE (BENADRYL) injection 50 mg, 50 mg, Intravenous, Once, Salud Rose, APRN - PEPPER    hydrocortisone sodium succinate PF (SOLU-CORTEF) injection 200 mg, 200 mg, Intravenous, Once, Rylie Molina, APRN - CNP    nitroGLYCERIN (NITROSTAT) SL tablet 0.4 mg, 0.4 mg, Sublingual, Q5 Min PRN, Salud Rose, APRN - CNP    atorvastatin (LIPITOR) tablet 20 mg, 20 mg, Oral, Daily, Isra Cash MD, 20 mg at 04/22/21 2009    carvedilol (COREG) tablet 12.5 mg, 12.5 mg, Oral, BID WC, Isra Cash MD    sodium chloride flush 0.9 % injection 5-40 mL, 5-40 mL, Intravenous, 2 times per day, Isra Cash MD, 5 mL at 04/22/21 2009    sodium chloride flush 0.9 % injection 5-40 mL, 5-40 mL, Intravenous, PRN, Isra Cash MD, 10 mL at 04/22/21 1740    0.9 % sodium chloride infusion, 25 mL, Intravenous, PRN, Isra Cash MD    enoxaparin (LOVENOX) injection 40 mg, 40 mg, Subcutaneous, Q24H, Isra Cash MD, 40 mg at 04/21/21 1633    promethazine (PHENERGAN) tablet 12.5 mg, 12.5 mg, Oral, Q6H PRN **OR** ondansetron (ZOFRAN) injection 4 mg, 4 mg, Intravenous, Q6H PRN, Isra Cash MD    polyethylene glycol (GLYCOLAX) packet 17 g, 17 g, Oral, Daily PRN, Isra Cash MD    acetaminophen (TYLENOL) tablet 650 mg, 650 mg, Oral, Q6H PRN **OR** acetaminophen (TYLENOL) suppository 650 mg, 650 mg, Rectal, Q6H PRN, Isra Cash MD    busPIRone (BUSPAR) tablet 5 mg, 5 mg, Oral, Daily PRN, Isra Cash MD    citalopram (CELEXA) tablet 40 mg, 40 mg, Oral, Daily, Isra Cash MD, 40 mg at 04/22/21 1144    insulin lispro (HUMALOG) injection vial 0-6 Units, 0-6 Units, Subcutaneous, TID WC, Isra Cash MD    insulin lispro (HUMALOG) injection vial 0-3 Units, 0-3 Units, Subcutaneous, Nightly, Isra Cash MD    glucose (GLUTOSE) 40 % oral gel 15 g, 15 g, Oral, PRN, Isra Cash MD    dextrose 50 % IV solution, 12.5 g, Intravenous, PRN, Patrice Singleton MD    glucagon (rDNA) injection 1 mg, 1 mg, Intramuscular, PRN, Patrice Singleton MD    dextrose 5 % solution, 100 mL/hr, Intravenous, PRN, Patrice Singleton MD  Prior to Admission medications    Medication Sig Start Date End Date Taking? Authorizing Provider   citalopram (CELEXA) 40 MG tablet Take 40 mg by mouth daily   Yes Historical Provider, MD   metFORMIN (GLUCOPHAGE) 500 MG tablet Take 500 mg by mouth 2 times daily (with meals)   Yes Historical Provider, MD   sacubitril-valsartan (ENTRESTO) 24-26 MG per tablet Take 1 tablet by mouth 2 times daily   Yes Historical Provider, MD   carvedilol (COREG) 12.5 MG tablet Take 12.5 mg by mouth 2 times daily (with meals). Yes Historical Provider, MD   atorvastatin (LIPITOR) 10 MG tablet Take 20 mg by mouth daily    Yes Historical Provider, MD   furosemide (LASIX) 20 MG tablet Take 20 mg by mouth as needed    Historical Provider, MD   busPIRone (BUSPAR) 5 MG tablet Take 5 mg by mouth as needed    Historical Provider, MD     Additional information:       VITAL SIGNS   Vitals:    04/23/21 0335   BP: 115/73   Pulse: 67   Resp: 16   Temp: 98.1 °F (36.7 °C)   SpO2: 97%       PHYSICAL:   General: No acute distress  HEENT:  Unremarkable for age  Neck: without increased JVD, carotid pulses 2+ bilaterally without bruits  Heart: RRR, S1 & S2 WNL.  No murmurs   Lungs: Clear to auscultation    Abdomen: BS present, without HSM, masses, or tenderness    Extremities: without C,C,E.  Pulses 2+ bilaterally   Mental Status: Alert & Oriented        PLANNED PROCEDURE   [x]Cath  [x]PCI                []Pacemaker/AICD  []LAKSHMI             []Cardioversion []Peripheral angiography/PTA  []Other:      SEDATION  Planned agent:[x]Midazolam []Meperidine []Sublimaze []Morphine  []Diazepam  []Other:     ASA Classification:  []1 []2 [x]3 []4 []5   Class 1: A normal healthy patient  Class 2: Pt with mild to moderate systemic disease  Class 3: Severe systemic disease or

## 2021-04-23 NOTE — DISCHARGE SUMMARY
04/23/21 1116 04/23/21 1130   BP: (!) 109/48 (!) 105/53  (!) 106/55   Pulse: 70 70  70   Resp: 14 15  16   Temp:   97.6 °F (36.4 °C)    TempSrc:   Oral    SpO2: 95% 96%  94%   Weight:       Height:         Weight: Weight: 139 lb 12.8 oz (63.4 kg)     24 hour intake/output:    Intake/Output Summary (Last 24 hours) at 4/23/2021 1136  Last data filed at 4/23/2021 1361  Gross per 24 hour   Intake 1095 ml   Output 2950 ml   Net -1855 ml         Labs: For convenience and continuity at follow-up the following most recent labs are provided:      CBC:    Lab Results   Component Value Date    WBC 5.5 04/23/2021    HGB 12.3 04/23/2021    HCT 38.5 04/23/2021     04/23/2021       Renal:    Lab Results   Component Value Date     04/23/2021     04/23/2021    K 3.2 04/23/2021    K 3.2 04/23/2021     04/23/2021     04/23/2021    CO2 28 04/23/2021    CO2 27 04/23/2021    BUN 21 04/23/2021    BUN 21 04/23/2021    CREATININE 0.6 04/23/2021    CREATININE 0.6 04/23/2021    CALCIUM 9.7 04/23/2021    CALCIUM 9.7 04/23/2021         Significant Diagnostic Studies    Radiology:   XR CHEST PORTABLE   Final Result   1. Mild cardiomegaly and prominence of the left heart border, in this patient status post pacemaker placement and previous sternotomy. 2. Otherwise negative chest x-ray. **This report has been created using voice recognition software. It may contain minor errors which are inherent in voice recognition technology. **      Final report electronically signed by DR Josias Quarles on 4/21/2021 11:17 AM      XR CHEST (2 VW)    (Results Pending)          Consults:     IP CONSULT TO CARDIOLOGY  IP CONSULT TO CARDIOLOGY  IP CONSULT TO CARDIAC REHAB    Disposition:    [x] Home       [] TCU       [] Rehab       [] Psych       [] SNF       [] [place]       [] Other-    Condition at Discharge: Stable    Code Status:  Full Code     Patient Instructions:     Activity: activity as tolerated  Diet: DIET CARDIAC; Daily Fluid Restriction: 1800 ml      Follow-up visits:   No follow-up provider specified. Discharge Medications:      Damian Pittman   Home Medication Instructions MIKE:[id number]    Printed on:04/23/21 1142   Medication Information                      atorvastatin (LIPITOR) 10 MG tablet  Take 20 mg by mouth daily              busPIRone (BUSPAR) 5 MG tablet  Take 5 mg by mouth as needed             carvedilol (COREG) 12.5 MG tablet  Take 12.5 mg by mouth 2 times daily (with meals). citalopram (CELEXA) 40 MG tablet  Take 40 mg by mouth daily             furosemide (LASIX) 20 MG tablet  Take 20 mg by mouth as needed             metFORMIN (GLUCOPHAGE) 500 MG tablet  Take 500 mg by mouth 2 times daily (with meals)             sacubitril-valsartan (ENTRESTO) 24-26 MG per tablet  Take 1 tablet by mouth 2 times daily             spironolactone (ALDACTONE) 25 MG tablet  Take 1 tablet by mouth daily                 Time Spent on discharge is more than 30 minutes in the examination, evaluation, counseling and review of medications and discharge plan. Signed: Thank you Eusebio Alexander MD for the opportunity to be involved in this patient's care.     Electronically signed by Nilsa Mendez MD on 4/23/2021 at 11:36 AM

## 2021-04-23 NOTE — PROGRESS NOTES
Discharge teaching and instructions for procedure of [facility] completed with patient using teachback method. AVS reviewed. Printed prescriptions given to patient. Patient voiced understanding regarding prescriptions, follow up appointments, and care of self at home. Discharged home with all belongings.

## 2021-04-23 NOTE — BRIEF OP NOTE
[address] [address]  Sedation/Analgesia Post Sedation Record    Pt Name: Sherlene Kayser  Account number: [de-identified]  MRN: [id number]  YOB: 1960  Procedure Performed By: Senthil Cooper MD   Primary Care Physician: Darvin Crawford MD  Date: 4/23/2021    POST-PROCEDURE    Physicians/Assistants: Senthil Cooper MD     Procedure Performed:Cath      Sedation/Anesthesia: Versed/ Fentanyl and 2% xylocaine local anesthesia. Estimated Blood Loss: < 50 ml. Specimens Removed: None         Disposition of Specimen: N/A        Complications: No Immediate Complications. Post-procedure Diagnosis/Findings:        Nonobstructive CAD     Recommendations:   Medical management      Above findings and plan of care were discussed with patient and family, questions were answered, agreeable with plan.        Senthil Cooper MD, Rafia Carrion   Electronically signed 4/23/2021 at 9:22 AM  Interventional Cardiology

## 2021-04-23 NOTE — CARE COORDINATION
4/23/21, 8:42 AM EDT    DISCHARGE ON GOING EVALUATION    Shirley Valdes day: 2  Location: -15/015-A Reason for admit: Chest pain [R07.9]  Unstable angina ([address] [I20.0]   Procedure: 04/23  Left heart cath at 9am   Barriers to Discharge: NPO after MN, consent. 4/23/21, 12:59 PM EDT  Home with spouse; declined in-home services. Patient goals/plan/ treatment preferences discussed by  and . Patient goals/plan/ treatment preferences reviewed with patient/ family. Patient/ family verbalize understanding of discharge plan and are in agreement with goal/plan/treatment preferences. Understanding was demonstrated using the teach back method. AVS provided by RN at time of discharge, which includes all necessary medical information pertaining to the patients current course of illness, treatment, post-discharge goals of care, and treatment preferences.

## 2021-04-23 NOTE — PROGRESS NOTES
addd on therapy - shes ok with taking it       Objective:   BP (!) 106/53   Pulse 70   Temp 98 °F (36.7 °C) (Oral)   Resp 16   Ht 4' 9\" (1.448 m)   Wt 139 lb 12.8 oz (63.4 kg)   SpO2 92%   BMI 30.25 kg/m²        TELEMETRY: paced 70    Physical Exam:  General Appearance: alert and oriented to person, place and time, in no acute distress  Cardiovascular: normal rate, regular rhythm, normal S1 and S2, no murmurs, rubs, clicks, or gallops, distal pulses intact,   Pulmonary/Chest: clear upper - diminished in lower bases to auscultation bilaterally- no wheezes, rales or rhonchi, normal air movement, no respiratory distress  Abdomen: soft, non-tender, non-distended, normal bowel sounds, no masses Extremities: no cyanosis, clubbing or edema, pulses present   Skin: warm and dry, no rash or erythema  Head: normocephalic and atraumatic  Musculoskeletal: normal range of motion, no joint swelling, deformity or tenderness  Neurological: alert, oriented, normal speech, no focal findings or movement disorder noted    Medications:    potassium chloride  20 mEq Oral BID WC    sodium chloride flush  5-40 mL Intravenous 2 times per day    furosemide  40 mg Intravenous BID    diphenhydrAMINE  50 mg Intravenous Once    hydrocortisone sodium succinate PF  200 mg Intravenous Once    atorvastatin  20 mg Oral Daily    carvedilol  12.5 mg Oral BID WC    sodium chloride flush  5-40 mL Intravenous 2 times per day    enoxaparin  40 mg Subcutaneous Q24H    citalopram  40 mg Oral Daily    insulin lispro  0-6 Units Subcutaneous TID WC    insulin lispro  0-3 Units Subcutaneous Nightly      dextrose 5% and 0.45% NaCl with KCl 20 mEq 100 mL/hr at 04/23/21 0940    sodium chloride      sodium chloride      sodium chloride      dextrose       sodium chloride flush, 5-40 mL, PRN  sodium chloride, 25 mL, PRN  acetaminophen, 650 mg, Q4H PRN  potassium chloride, 20 mEq, PRN  potassium chloride, 40 mEq, PRN    Or  potassium alternative oral replacement, 40 mEq, PRN    Or  potassium chloride, 10 mEq, PRN  sodium chloride, 25 mL, PRN  nitroGLYCERIN, 0.4 mg, Q5 Min PRN  sodium chloride flush, 5-40 mL, PRN  sodium chloride, 25 mL, PRN  promethazine, 12.5 mg, Q6H PRN    Or  ondansetron, 4 mg, Q6H PRN  polyethylene glycol, 17 g, Daily PRN  acetaminophen, 650 mg, Q6H PRN  busPIRone, 5 mg, Daily PRN  glucose, 15 g, PRN  dextrose, 12.5 g, PRN  glucagon (rDNA), 1 mg, PRN  dextrose, 100 mL/hr, PRN        Diagnostics:  EK2021 05:58:31 [facility] ROUTINE RETRIEVAL  AV dual-paced rhythm  Abnormal ECG  When compared with ECG of 2021 10:26,  Premature ventricular complexes are no longer Present  Ventricular rate has decreased BY 7 BPM    2021 10:26:56 [facility] ROUTINE RETRIEVAL  Atrial-sensed ventricular-paced rhythm with occasional Premature ventricular complexes  Abnormal ECG  When compared with ECG of 30-SEP-2013 16:42,  Premature ventricular complexes are now Present  Ventricular rate has increased BY 7 BPM  Confirmed by Chance Glez MD ([id number]) on 2021 1:38:52 PM      Echo:  Electronically signed by Jessica Chappell MD (Interpreting   physician) on 2021 at 11:06 AM   ----------------------------------------------------------------      Findings      Mitral Valve   The mitral valve structure is normal with normal leaflet separation. DOPPLER: The transmitral velocity was within the normal range with no   evidence for mitral stenosis. Mild to moderate mitral regurgitation is present. Aortic Valve   The aortic valve appears trileaflet with normal thickness and leaflet   excursion. DOPPLER: Transaortic velocity was within the normal range with   no evidence of aortic stenosis. There was no evidence of aortic   regurgitation. Tricuspid Valve   The tricuspid valve structure is normal with normal leaflet separation. DOPPLER: There is no evidence of tricuspid stenosis.  There was no evidence   of tricuspid regurgitation. Pulmonic Valve   The pulmonic valve leaflets appear normal thickness, and normal cuspal   separation. DOPPLER: The transpulmonic velocity was within the normal   range. No evidence for regurgitation. Left Atrium   Severely dilated left atrium. Left Ventricle   Left ventricular size is normal and systolic function is moderately   reduced. Ejection fraction was estimated at 40-45%. LV wall thickness is   mildly increased. There is anterior wall hypokinesis. Doppler parameters were consistent with a reversible restrictive pattern,   indicative of decreased left ventricular diastolic compliance and/or   increased left atrial pressure (grade 3 diastolic dysfunction). Right Atrium   Right atrial size was normal.      Right Ventricle   The right ventricular size appears normal with normal systolic function   and wall thickness. Device lead/catheter visualized in RV. Pericardial Effusion   The pericardium appears normal with no evidence of a pericardial effusion. Pleural Effusion   No evidence of pleural effusion. Aorta / Great Vessels   -Aortic root dimension within normal limits. -IVC not well visualized. Left Heart Cath:    Post-procedure Diagnosis/Findings:                                   · Nonobstructive CAD        Recommendations:  · Medical management                 Above findings and plan of care were discussed with patient and family, questions were answered, agreeable with plan.         Angeline Back MD, [place] Landon   Electronically signed 4/23/2021 at 9:22 AM  Interventional Cardiology    Lab Data:    Cardiac Enzymes:  No results for input(s): CKTOTAL, CKMB, CKMBINDEX, TROPONINI in the last 72 hours.     CBC:   Lab Results   Component Value Date    WBC 5.5 04/23/2021    RBC 4.23 04/23/2021    HGB 12.3 04/23/2021    HCT 38.5 04/23/2021     04/23/2021       CMP:    Lab Results   Component Value Date     04/23/2021  04/23/2021    K 3.2 04/23/2021    K 3.2 04/23/2021     04/23/2021     04/23/2021    CO2 28 04/23/2021    CO2 27 04/23/2021    BUN 21 04/23/2021    BUN 21 04/23/2021    CREATININE 0.6 04/23/2021    CREATININE 0.6 04/23/2021    GFRAA >60 03/04/2020    LABGLOM >90 04/23/2021    LABGLOM >90 04/23/2021    GLUCOSE 94 04/23/2021    GLUCOSE 94 04/23/2021    CALCIUM 9.7 04/23/2021    CALCIUM 9.7 04/23/2021       Hepatic Function Panel:    Lab Results   Component Value Date    ALKPHOS 81 04/23/2021    ALT 22 04/23/2021    AST 18 04/23/2021    PROT 6.9 04/23/2021    BILITOT 3.5 04/23/2021    BILIDIR 0.33 09/02/2020    IBILI 1.21 09/02/2020    LABALBU 4.3 04/23/2021       Magnesium:    Lab Results   Component Value Date    MG 2.2 04/23/2021    MG 2.2 04/23/2021       PT/INR:    Lab Results   Component Value Date    INR 1.14 04/23/2021       HgBA1c:    Lab Results   Component Value Date    LABA1C 5.6 04/21/2021       FLP:    Lab Results   Component Value Date    TRIG 112 04/21/2021    HDL 44 04/21/2021    LDLCALC 56 04/21/2021       TSH:    Lab Results   Component Value Date    TSH 1.53 03/04/2020         Assessment:    Acute on chronic diastolic chf NYHC4 -- improving     S\p cardiac cath 4/23/2021: nonobstructive CAD     Mild trop elevation - not NSTEMI    Mild to moderate MR-- watch   EF 40% (longstanding per pt)    Hx HOCM with myomectomy 1990  Hx SCD 2004 - s\p ICD     HLP      Plan:  · Ok to DC from cardiology standpoint  · decrease lasix to 40 mg po daily prn weight gain   · Add aldactone to CHF regimen   ·   Salt restriction 2 gm ( 2,000 mg) daily   · Fluid restriction 2 L daily    Weigh yourself daily: Should you gain 2-3 pounds in 1 days time or 3-5 pounds in 2 days time-- call your primary cardiologist for further recommendations    Follow with Primary cardio in [address] 2=4 weeks                Electronically signed by SUZE Lee CNP on 4/23/2021 at 10:07 AM

## 2021-04-23 NOTE — CARE COORDINATION
4/23/21, 12:52 PM EDT    Patient goals/plan/ treatment preferences discussed by  and . Patient goals/plan/ treatment preferences reviewed with patient/ family. Patient/ family verbalize understanding of discharge plan and are in agreement with goal/plan/treatment preferences. Understanding was demonstrated using the teach back method. AVS provided by RN at time of discharge, which includes all necessary medical information pertaining to the patients current course of illness, treatment, post-discharge goals of care, and treatment preferences.

## 2021-04-23 NOTE — PROCEDURES
[address] [place] [address]                            CARDIAC CATHETERIZATION    PATIENT NAME: Enma Valdes                      :        1960  MED REC NO:   [id number]                           ROOM:       [id number]  ACCOUNT NO:   [de-identified]                           ADMIT DATE: 2021  PROVIDER:     Jeremy Davis MD    DATE OF PROCEDURE:  2021    INDICATIONS FOR PROCEDURE:  1. Acute congestive heart failure. 2.  Cardiomyopathy. 3.  Ejection fraction 40% to 45% with wall motion abnormality. 4.  Dyspnea, shortness of breath. 6.  Elevated troponin. 7.  Suspected non-ST-elevation MI. PROCEDURES PERFORMED:  1. Left cardiac catheterization with selective coronary angiogram.  2.  Left ventriculography. DESCRIPTION OF PROCEDURE:  After informed consent, the patient was  brought to the cardiac catheterization room. She was prepped and draped  in a sterile fashion. 2% lidocaine was injected in the skin,  subcutaneous tissue overlying the right radial artery. Using modified  Seldinger technique, I obtained access in the right radial artery. 5/6  Slender sheath was inserted. Standard antithrombotic/antispasmodic  medications were given. I used 5-Finnish JR4, 5-Finnish JL3.5 diagnostic  catheters to complete the procedure. FINDINGS:  HEMODYNAMICS:  Left ventricular end-diastolic pressure 14 mmHg. LEFT VENTRICULOGRAM:  Mild hypokinesis of anterior wall. Ejection  fraction 45%. CORONARY ANGIOGRAM:  1. RIGHT CORONARY ARTERY:  The right coronary artery is widely patent  without significant stenosis. 2.  LEFT MAIN CORONARY ARTERY:  Patent without significant stenosis. Bifurcates into LAD and LCX. 3.  LEFT CIRCUMFLEX ARTERY:  Left circumflex artery is patent without  significant stenotic lesions. 4.  LEFT ANTERIOR DESCENDING ARTERY:  Proximal LAD is patent. Mid LAD  has 30% to 40% stenosis.   Distal LAD with mild-to-moderate diffuse  disease. D1 is patent. D2 is patent. 5.  DOMINANCE:  Codominant system. MEDICATIONS:  See MAR. COMPLICATIONS:  None. ESTIMATED BLOOD LOSS:  Less than 50 mL. ACCESS:  Right radial artery access. Vasc Band was applied. Hemostasis  was achieved. IMPRESSION:  Nonobstructive coronary artery disease. RECOMMENDATIONS:  Medical therapy and aggressive risk factor  modification.         Nemiah Sicard, MD    D: 04/23/2021 9:27:50       T: 04/23/2021 9:39:01     [email or web address]  Job#: [id number]     Doc#: [id number]    CC: